# Patient Record
Sex: FEMALE | Race: BLACK OR AFRICAN AMERICAN | Employment: OTHER | URBAN - METROPOLITAN AREA
[De-identification: names, ages, dates, MRNs, and addresses within clinical notes are randomized per-mention and may not be internally consistent; named-entity substitution may affect disease eponyms.]

---

## 2017-11-14 ENCOUNTER — APPOINTMENT (OUTPATIENT)
Dept: ULTRASOUND IMAGING | Age: 72
End: 2017-11-14
Attending: PHYSICIAN ASSISTANT
Payer: MEDICARE

## 2017-11-14 ENCOUNTER — HOSPITAL ENCOUNTER (EMERGENCY)
Age: 72
Discharge: HOME | End: 2017-11-14
Attending: EMERGENCY MEDICINE
Payer: MEDICARE

## 2017-11-14 VITALS
SYSTOLIC BLOOD PRESSURE: 130 MMHG | RESPIRATION RATE: 12 BRPM | BODY MASS INDEX: 45.07 KG/M2 | HEIGHT: 66 IN | WEIGHT: 280.43 LBS | TEMPERATURE: 97.9 F | DIASTOLIC BLOOD PRESSURE: 77 MMHG | HEART RATE: 72 BPM | OXYGEN SATURATION: 99 %

## 2017-11-14 DIAGNOSIS — M79.662 PAIN OF LEFT CALF: Primary | ICD-10-CM

## 2017-11-14 PROCEDURE — 99283 EMERGENCY DEPT VISIT LOW MDM: CPT

## 2017-11-14 PROCEDURE — 93971 EXTREMITY STUDY: CPT

## 2017-11-14 PROCEDURE — 99282 EMERGENCY DEPT VISIT SF MDM: CPT

## 2017-11-14 RX ORDER — LISINOPRIL 20 MG/1
20 TABLET ORAL DAILY
COMMUNITY

## 2017-11-14 RX ORDER — HYDROCHLOROTHIAZIDE 12.5 MG/1
12.5 TABLET ORAL DAILY
COMMUNITY

## 2017-11-14 RX ORDER — MELATONIN
1000 DAILY
COMMUNITY

## 2017-11-14 RX ORDER — LANOLIN ALCOHOL/MO/W.PET/CERES
CREAM (GRAM) TOPICAL
COMMUNITY

## 2017-11-14 RX ORDER — TRAMADOL HYDROCHLORIDE 50 MG/1
50 TABLET ORAL
Qty: 20 TAB | Refills: 0 | Status: SHIPPED | OUTPATIENT
Start: 2017-11-14

## 2017-11-14 NOTE — ED PROVIDER NOTES
[facility].  EMERGENCY DEPARTMENT HISTORY AND PHYSICAL EXAM         Date of Service: 11/14/2017   Patient Name: Eduardo Bello   YOB: 1945  Medical Record Number: [id number]    History of Presenting Illness     Chief Complaint   Patient presents with    Knee Pain     Onset of pain to left knee last night radiating down front of calf. Patient denies injury. History Provided By:  patient    Additional History:   Eduardo Bello is a 74 y.o. female with no significant PMHx who presents ambulatory to the ED with cc of constant, moderate left calf pain and tightness since last night. The pain is exacerbated by walking. Pt states she has not been on an recent long trips, and she is not on any hormone replacement, nor any recent surgeries. She denies any history of similar symptoms, and recent injury, or any headache, dizziness, light headedness, shortness of breath, chest pain, abdominal pain, nausea, vomiting, diarrhea, dysuria. Social Hx: - Tobacco, - EtOH, - Illicit Drugs    There are no other complaints, changes or physical findings at this time. Primary Care Provider: Arron Ray NP     Past History     Past Medical History:   History reviewed. No pertinent past medical history. Past Surgical History:   History reviewed. No pertinent surgical history. Family History:   Family History   Problem Relation Age of Onset    Breast Cancer Sister      under 48        Social History:   Social History   Substance Use Topics    Smoking status: Never Smoker    Smokeless tobacco: Never Used    Alcohol use None        Allergies:   No Known Allergies     Review of Systems   Review of Systems   Constitutional: Negative. HENT: Negative. Eyes: Negative. Respiratory: Negative. Negative for shortness of breath. Cardiovascular: Negative. Negative for chest pain. Gastrointestinal: Negative. Negative for abdominal pain, diarrhea, nausea and vomiting. Genitourinary: Negative. Musculoskeletal: Positive for myalgias (left calf pain and tightness). Skin: Negative. Neurological: Negative for dizziness, light-headedness and headaches. All other systems reviewed and are negative. Physical Exam  Physical Exam   Constitutional: She is oriented to person, place, and time. She appears well-developed and well-nourished. No distress. Pleasant, overweight 73 yo -American female   HENT:   Head: Normocephalic and atraumatic. Eyes: Conjunctivae are normal. Right eye exhibits no discharge. Left eye exhibits no discharge. Neck: Normal range of motion. Neck supple. Cardiovascular: Normal rate, regular rhythm, normal heart sounds and intact distal pulses. No murmur heard. Pulmonary/Chest: Effort normal and breath sounds normal. No respiratory distress. She has no wheezes. Musculoskeletal:   L LEG: No swelling, ecchymosis or deformity. Very slight tenderness to palpation of the calf. Otherwise NT to palpation of the left lower extremity. Distal NV intact. Cap refill < 2 sec. Ambulatory with slight limp. Neurological: She is alert and oriented to person, place, and time. Skin: Skin is warm and dry. She is not diaphoretic. Psychiatric: She has a normal mood and affect. Her behavior is normal.   Nursing note and vitals reviewed. Medical Decision Making   I am the first provider for this patient. I reviewed the vital signs, available nursing notes, past medical history, past surgical history, family history and social history. Old Medical Records: Old medical records. Provider Notes:   DDx: DVT, sprain, strain     ED Course:  9:39 AM   Initial assessment performed. The patients presenting problems have been discussed, and they are in agreement with the care plan formulated and outlined with them. I have encouraged them to ask questions as they arise throughout their visit.     Diagnostic Study Results   Radiologic Studies -  The following have been ordered and reviewed:  Study Result      INDICATION:  Left leg swelling and pain, DVT suspected     COMPARISON: 2/11/2012     FINDINGS: Duplex Doppler sonography of the left lower extremity was performed  from the groin to the calf. The left common femoral, femoral and popliteal veins  are compressible with normal color-flow and wave forms and response to  physiologic maneuvers including Valsalva and augmentation.     IMPRESSION  IMPRESSION:  No deep venous thrombosis identified.            Vital Signs-Reviewed the patient's vital signs. Patient Vitals for the past 12 hrs:   Temp Pulse Resp BP SpO2   11/14/17 1027 97.9 °F (36.6 °C) 72 12 130/77 99 %       Diagnosis:  Clinical Impression:   1. Pain of left calf         Plan:  1:   Follow-up Information     Follow up With Details Comments Katarina Jacobs, NP In 1 week As needed [address] [address]  [phone]            2:   Discharge Medication List as of 11/14/2017 12:10 PM      START taking these medications    Details   traMADol (ULTRAM) 50 mg tablet Take 1 Tab by mouth every six (6) hours as needed for Pain. Max Daily Amount: 200 mg., Print, Disp-20 Tab, R-0         CONTINUE these medications which have NOT CHANGED    Details   lisinopril (PRINIVIL, ZESTRIL) 20 mg tablet Take 20 mg by mouth daily. , Historical Med      ferrous sulfate (IRON) 325 mg (65 mg iron) tablet Take  by mouth Daily (before breakfast). , Historical Med      hydroCHLOROthiazide (HYDRODIURIL) 12.5 mg tablet Take 12.5 mg by mouth daily. , Historical Med      cholecalciferol (VITAMIN D3) 1,000 unit tablet Take 1,000 Units by mouth daily. , Historical Med      amLODIPine (NORVASC) 10 mg tablet Take 1 Tab by mouth daily. , Print, Disp-30 Tab, R-0           Return to ED if worse. Disposition:    DISCHARGE NOTE  12:25 PM  The patient has been re-evaluated and is ready for discharge. Reviewed available results with patient.  Counseled pt on diagnosis and care plan. Pt has expressed understanding, and all questions have been answered. Pt agrees with plan and agrees to follow up as recommended, or return to the ED if their symptoms worsen. Discharge instructions have been provided and explained to the pt, along with reasons to return to the ED. Attestations: This note is prepared by Reese Milian, acting as Scribe for [facility]: The scribe's documentation has been prepared under my direction and personally reviewed by me in its entirety. I confirm that the note above accurately reflects all work, treatment, procedures, and medical decision making performed by me.

## 2017-11-14 NOTE — ED NOTES
Patient discharged by KAI Guevara. Patient provided with discharge instructions Rx and instructions on follow up care. Patient out of ED ambulatory accompanied by family.

## 2017-11-14 NOTE — ED NOTES
Pt state she fell 2 weeks ago and woke up this morning with pain in her left knee/lower leg. Pt states she \" can't walk on it is too painful. \" pt took her morning medications for HTN but has no other hx. Denies recent injury too limb. Positive pedal pulse felt.

## 2017-11-14 NOTE — DISCHARGE INSTRUCTIONS
Leg Pain: Care Instructions  Your Care Instructions  Many things can cause leg pain. Too much exercise or overuse can cause a muscle cramp (or charley horse). You can get leg cramps from not eating a balanced diet that has enough potassium, calcium, and other minerals. If you do not drink enough fluids or are taking certain medicines, you may develop leg cramps. Other causes of leg pain include injuries, blood flow problems, nerve damage, and twisted and enlarged veins (varicose veins). You can usually ease pain with self-care. Your doctor may recommend that you rest your leg and keep it elevated. Follow-up care is a key part of your treatment and safety. Be sure to make and go to all appointments, and call your doctor if you are having problems. It's also a good idea to know your test results and keep a list of the medicines you take. How can you care for yourself at home? · Take pain medicines exactly as directed. ¨ If the doctor gave you a prescription medicine for pain, take it as prescribed. ¨ If you are not taking a prescription pain medicine, ask your doctor if you can take an over-the-counter medicine. · Take any other medicines exactly as prescribed. Call your doctor if you think you are having a problem with your medicine. · Rest your leg while you have pain, and avoid standing for long periods of time. · Prop up your leg at or above the level of your heart when possible. · Make sure you are eating a balanced diet that is rich in calcium, potassium, and magnesium, especially if you are pregnant. · If directed by your doctor, put ice or a cold pack on the area for 10 to 20 minutes at a time. Put a thin cloth between the ice and your skin. · Your leg may be in a splint, a brace, or an elastic bandage, and you may have crutches to help you walk. Follow your doctor's directions about how long to wear supports and how to use the crutches. When should you call for help?   Call 911 anytime you think you may need emergency care. For example, call if:  ? · You have sudden chest pain and shortness of breath, or you cough up blood. ? · Your leg is cool or pale or changes color. ?Call your doctor now or seek immediate medical care if:  ? · You have increasing or severe pain. ? · Your leg suddenly feels weak and you cannot move it. ? · You have signs of a blood clot, such as:  ¨ Pain in your calf, back of the knee, thigh, or groin. ¨ Redness and swelling in your leg or groin. ? · You have signs of infection, such as:  ¨ Increased pain, swelling, warmth, or redness. ¨ Red streaks leading from the sore area. ¨ Pus draining from a place on your leg. ¨ A fever. ? · You cannot bear weight on your leg. ? Watch closely for changes in your health, and be sure to contact your doctor if:  ? · You do not get better as expected. Where can you learn more? Go to http://obdulia-johnathan.info/. Enter V552 in the search box to learn more about \"Leg Pain: Care Instructions. \"  Current as of: March 20, 2017  Content Version: 11.4  © 0148-6425 [employer]. Care instructions adapted under license by [employer] (which disclaims liability or warranty for this information). If you have questions about a medical condition or this instruction, always ask your healthcare professional. [place] [address] any warranty or liability for your use of this information.

## 2019-03-04 ENCOUNTER — HOSPITAL ENCOUNTER (OUTPATIENT)
Dept: INFUSION THERAPY | Age: 74
End: 2019-03-04

## 2019-03-15 ENCOUNTER — HOSPITAL ENCOUNTER (OUTPATIENT)
Dept: INFUSION THERAPY | Age: 74
Discharge: HOME | End: 2019-03-15
Payer: MEDICARE

## 2019-03-15 VITALS
RESPIRATION RATE: 18 BRPM | DIASTOLIC BLOOD PRESSURE: 90 MMHG | HEART RATE: 66 BPM | TEMPERATURE: 99 F | SYSTOLIC BLOOD PRESSURE: 148 MMHG | OXYGEN SATURATION: 97 %

## 2019-03-15 LAB
ANION GAP BLD CALC-SCNC: 16 MMOL/L (ref 10–20)
BUN BLD-MCNC: 26 MG/DL (ref 9–20)
CA-I BLD-MCNC: 1.24 MMOL/L (ref 1.12–1.32)
CHLORIDE BLD-SCNC: 103 MMOL/L (ref 98–107)
CO2 BLD-SCNC: 26 MMOL/L (ref 21–32)
CREAT BLD-MCNC: 1.3 MG/DL (ref 0.6–1.3)
GLUCOSE BLD-MCNC: 98 MG/DL (ref 65–100)
HCT VFR BLD CALC: 33 % (ref 35–47)
MAGNESIUM SERPL-MCNC: 2.2 MG/DL (ref 1.6–2.4)
PHOSPHATE SERPL-MCNC: 3.4 MG/DL (ref 2.6–4.7)
POTASSIUM BLD-SCNC: 4.5 MMOL/L (ref 3.5–5.1)
SERVICE CMNT-IMP: ABNORMAL
SODIUM BLD-SCNC: 140 MMOL/L (ref 136–145)

## 2019-03-15 PROCEDURE — 84100 ASSAY OF PHOSPHORUS: CPT

## 2019-03-15 PROCEDURE — 36415 COLL VENOUS BLD VENIPUNCTURE: CPT

## 2019-03-15 PROCEDURE — 96372 THER/PROPH/DIAG INJ SC/IM: CPT

## 2019-03-15 PROCEDURE — 80047 BASIC METABLC PNL IONIZED CA: CPT

## 2019-03-15 PROCEDURE — 74011250636 HC RX REV CODE- 250/636

## 2019-03-15 PROCEDURE — 83735 ASSAY OF MAGNESIUM: CPT

## 2019-03-15 RX ADMIN — DENOSUMAB 60 MG: 60 INJECTION SUBCUTANEOUS at 14:09

## 2019-03-15 NOTE — PROGRESS NOTES
[facility] Progress Note    7161  Pt admit to [facility] for first prolia ambulatory in stable condition. Assessment completed. No acute concerns voiced. Visit Vitals  /90 (BP 1 Location: Left arm, BP Patient Position: Sitting)   Pulse 66   Temp 99 °F (37.2 °C)   Resp 18   SpO2 97%   Breastfeeding? No     Recent Results (from the past 12 hour(s))   POC CHEM8    Collection Time: 03/15/19  1:55 PM   Result Value Ref Range    Calcium, ionized (POC) 1.24 1.12 - 1.32 mmol/L    Sodium (POC) 140 136 - 145 mmol/L    Potassium (POC) 4.5 3.5 - 5.1 mmol/L    Chloride (POC) 103 98 - 107 mmol/L    CO2 (POC) 26 21 - 32 mmol/L    Anion gap (POC) 16 10 - 20 mmol/L    Glucose (POC) 98 65 - 100 mg/dL    BUN (POC) 26 (H) 9 - 20 mg/dL    Creatinine (POC) 1.3 0.6 - 1.3 mg/dL    GFRAA, POC 48 (L) >60 ml/min/1.73m2    GFRNA, POC 40 (L) >60 ml/min/1.73m2    Hematocrit (POC) 33 (L) 35.0 - 47.0 %    Comment Notified RN or MD immediately by        Labs within treatment parameters. Pt denies any recent dental work. Pt instructed to drink plenty of fluids, continue vitamin D supplementation, and avoid invasive dental procedures. Pt verbalized understanding. Medications:  Prolia SQ    1415  Pt tolerated treatment well. D/c home ambulatory in no distress. Pt aware of next appointment scheduled for 9/20/19 at 1400.

## 2019-03-15 NOTE — PROGRESS NOTES
Problem: Patient Education:  Go to Education Activity  Goal: Patient/Family Education  Outcome: Progressing Towards Goal  Prolia patient medication provided for patient.

## 2019-09-20 ENCOUNTER — HOSPITAL ENCOUNTER (OUTPATIENT)
Dept: INFUSION THERAPY | Age: 74
Discharge: HOME | End: 2019-09-20

## 2019-10-07 ENCOUNTER — HOSPITAL ENCOUNTER (OUTPATIENT)
Dept: INFUSION THERAPY | Age: 74
Discharge: HOME | End: 2019-10-07
Payer: MEDICARE

## 2019-10-08 ENCOUNTER — HOSPITAL ENCOUNTER (OUTPATIENT)
Dept: INFUSION THERAPY | Age: 74
Discharge: HOME | End: 2019-10-08
Payer: MEDICARE

## 2019-10-08 VITALS
DIASTOLIC BLOOD PRESSURE: 77 MMHG | HEIGHT: 65 IN | HEART RATE: 57 BPM | SYSTOLIC BLOOD PRESSURE: 131 MMHG | OXYGEN SATURATION: 98 % | WEIGHT: 271.9 LBS | TEMPERATURE: 98.3 F | BODY MASS INDEX: 45.3 KG/M2 | RESPIRATION RATE: 16 BRPM

## 2019-10-08 LAB
ANION GAP BLD CALC-SCNC: 13 MMOL/L (ref 10–20)
BUN BLD-MCNC: 27 MG/DL (ref 9–20)
CA-I BLD-MCNC: 1.28 MMOL/L (ref 1.12–1.32)
CHLORIDE BLD-SCNC: 105 MMOL/L (ref 98–107)
CO2 BLD-SCNC: 29 MMOL/L (ref 21–32)
CREAT BLD-MCNC: 1.3 MG/DL (ref 0.6–1.3)
GLUCOSE BLD-MCNC: 98 MG/DL (ref 65–100)
HCT VFR BLD CALC: 36 % (ref 35–47)
MAGNESIUM SERPL-MCNC: 1.9 MG/DL (ref 1.6–2.4)
PHOSPHATE SERPL-MCNC: 3.2 MG/DL (ref 2.6–4.7)
POTASSIUM BLD-SCNC: 4.5 MMOL/L (ref 3.5–5.1)
SERVICE CMNT-IMP: ABNORMAL
SODIUM BLD-SCNC: 141 MMOL/L (ref 136–145)

## 2019-10-08 PROCEDURE — 80047 BASIC METABLC PNL IONIZED CA: CPT

## 2019-10-08 PROCEDURE — 96372 THER/PROPH/DIAG INJ SC/IM: CPT

## 2019-10-08 PROCEDURE — 83735 ASSAY OF MAGNESIUM: CPT

## 2019-10-08 PROCEDURE — 84100 ASSAY OF PHOSPHORUS: CPT

## 2019-10-08 PROCEDURE — 36415 COLL VENOUS BLD VENIPUNCTURE: CPT

## 2019-10-08 PROCEDURE — 74011250636 HC RX REV CODE- 250/636: Performed by: NURSE PRACTITIONER

## 2019-10-08 RX ADMIN — DENOSUMAB 60 MG: 60 INJECTION SUBCUTANEOUS at 15:07

## 2019-10-08 NOTE — PROGRESS NOTES
[address] [facility] Progress Note    1450  Pt arrived ambulatory and in no distress for Prolia    Assessment unremarkable, no new concerns voiced. Labs obtained per order and sent for processing. Patient Vitals for the past 12 hrs:   Temp Pulse Resp BP SpO2   10/08/19 1450 98.3 °F (36.8 °C) (!) 57 16 131/77 98 %      Recent Results (from the past 12 hour(s))   POC CHEM8    Collection Time: 10/08/19  3:00 PM   Result Value Ref Range    Calcium, ionized (POC) 1.28 1.12 - 1.32 mmol/L    Sodium (POC) 141 136 - 145 mmol/L    Potassium (POC) 4.5 3.5 - 5.1 mmol/L    Chloride (POC) 105 98 - 107 mmol/L    CO2 (POC) 29 21 - 32 mmol/L    Anion gap (POC) 13 10 - 20 mmol/L    Glucose (POC) 98 65 - 100 mg/dL    BUN (POC) 27 (H) 9 - 20 mg/dL    Creatinine (POC) 1.3 0.6 - 1.3 mg/dL    GFRAA, POC 48 (L) >60 ml/min/1.73m2    GFRNA, POC 40 (L) >60 ml/min/1.73m2    Hematocrit (POC) 36 35.0 - 47.0 %    Comment Notified RN or MD immediately by         The following medications administered:  Medications Administered     denosumab (PROLIA) injection 60 mg     Admin Date  10/08/2019 Action  Given Dose  60 mg Route  SubCUTAneous Administered By  Edgar Watson RN              Pt tolerated treatment well. No s/s of adverse reaction noted. Pt provided with education on possible side effects of medication along with discharge instructions. Pt verbalized understanding. 1510  Pt discharged ambulatory in no acute distress.  Next appointment: 4/9/20 at 1:30 PM.

## 2020-04-09 ENCOUNTER — HOSPITAL ENCOUNTER (OUTPATIENT)
Dept: INFUSION THERAPY | Age: 75
End: 2020-04-09

## 2020-10-20 ENCOUNTER — TRANSCRIBE ORDER (OUTPATIENT)
Dept: SCHEDULING | Age: 75
End: 2020-10-20

## 2020-10-20 DIAGNOSIS — Z12.31 VISIT FOR SCREENING MAMMOGRAM: Primary | ICD-10-CM

## 2020-10-20 DIAGNOSIS — Z78.0 ASYMPTOMATIC MENOPAUSAL STATE: ICD-10-CM

## 2020-12-09 ENCOUNTER — HOSPITAL ENCOUNTER (OUTPATIENT)
Dept: BONE DENSITY | Age: 75
Discharge: HOME | End: 2020-12-09
Attending: NURSE PRACTITIONER
Payer: MEDICARE

## 2020-12-09 ENCOUNTER — HOSPITAL ENCOUNTER (OUTPATIENT)
Dept: MAMMOGRAPHY | Age: 75
Discharge: HOME | End: 2020-12-09
Attending: NURSE PRACTITIONER
Payer: MEDICARE

## 2020-12-09 DIAGNOSIS — Z78.0 ASYMPTOMATIC MENOPAUSAL STATE: ICD-10-CM

## 2020-12-09 DIAGNOSIS — Z12.31 VISIT FOR SCREENING MAMMOGRAM: ICD-10-CM

## 2020-12-09 PROCEDURE — 77080 DXA BONE DENSITY AXIAL: CPT

## 2020-12-09 PROCEDURE — 77067 SCR MAMMO BI INCL CAD: CPT

## 2021-12-07 ENCOUNTER — TRANSCRIBE ORDER (OUTPATIENT)
Dept: SCHEDULING | Age: 76
End: 2021-12-07

## 2021-12-07 DIAGNOSIS — Z12.31 SCREENING MAMMOGRAM FOR HIGH-RISK PATIENT: Primary | ICD-10-CM

## 2023-05-11 RX ORDER — FERROUS SULFATE 325(65) MG
TABLET ORAL
COMMUNITY

## 2023-05-11 RX ORDER — AMLODIPINE BESYLATE 10 MG/1
TABLET ORAL DAILY
COMMUNITY
Start: 2014-03-09

## 2023-05-11 RX ORDER — TRAMADOL HYDROCHLORIDE 50 MG/1
TABLET ORAL EVERY 6 HOURS PRN
COMMUNITY
Start: 2017-11-14

## 2023-05-11 RX ORDER — LISINOPRIL 20 MG/1
20 TABLET ORAL DAILY
COMMUNITY

## 2023-05-11 RX ORDER — HYDROCHLOROTHIAZIDE 12.5 MG/1
12.5 TABLET ORAL DAILY
COMMUNITY

## 2023-08-30 ENCOUNTER — TRANSCRIBE ORDERS (OUTPATIENT)
Facility: HOSPITAL | Age: 78
End: 2023-08-30

## 2023-08-30 DIAGNOSIS — M81.0 OSTEOPOROSIS, UNSPECIFIED OSTEOPOROSIS TYPE, UNSPECIFIED PATHOLOGICAL FRACTURE PRESENCE: Primary | ICD-10-CM

## 2023-08-30 DIAGNOSIS — Z78.0 ASYMPTOMATIC MENOPAUSAL STATE: ICD-10-CM

## 2023-10-23 ENCOUNTER — HOSPITAL ENCOUNTER (EMERGENCY)
Facility: HOSPITAL | Age: 78
Discharge: HOME | End: 2023-10-23
Attending: EMERGENCY MEDICINE
Payer: MEDICARE

## 2023-10-23 VITALS
TEMPERATURE: 97.9 F | SYSTOLIC BLOOD PRESSURE: 185 MMHG | WEIGHT: 268.96 LBS | RESPIRATION RATE: 18 BRPM | OXYGEN SATURATION: 99 % | BODY MASS INDEX: 45.92 KG/M2 | DIASTOLIC BLOOD PRESSURE: 72 MMHG | HEIGHT: 64 IN | HEART RATE: 58 BPM

## 2023-10-23 DIAGNOSIS — M51.36 DEGENERATIVE DISC DISEASE, LUMBAR: ICD-10-CM

## 2023-10-23 DIAGNOSIS — M54.16 LUMBAR RADICULOPATHY: ICD-10-CM

## 2023-10-23 DIAGNOSIS — M79.605 LEFT LEG PAIN: Primary | ICD-10-CM

## 2023-10-23 PROCEDURE — 99283 EMERGENCY DEPT VISIT LOW MDM: CPT

## 2023-10-23 RX ORDER — PREDNISONE 20 MG/1
40 TABLET ORAL DAILY
Qty: 14 TABLET | Refills: 0 | Status: SHIPPED | OUTPATIENT
Start: 2023-10-23 | End: 2023-10-30

## 2023-10-23 RX ORDER — IBUPROFEN 600 MG/1
600 TABLET ORAL EVERY 6 HOURS PRN
Qty: 20 TABLET | Refills: 1 | Status: SHIPPED | OUTPATIENT
Start: 2023-10-23 | End: 2023-11-02

## 2023-10-23 ASSESSMENT — PAIN - FUNCTIONAL ASSESSMENT: PAIN_FUNCTIONAL_ASSESSMENT: 0-10

## 2023-10-23 ASSESSMENT — PAIN DESCRIPTION - LOCATION: LOCATION: LEG

## 2023-10-23 ASSESSMENT — PAIN SCALES - GENERAL: PAINLEVEL_OUTOF10: 5

## 2023-10-23 NOTE — ED PROVIDER NOTES
or so.  No injuries. She noticed a minor bruise on the lateral aspect of the lower leg, wondered if she might have a blood clot. No no history of swelling in the leg. No pain in the thigh. Any thromboembolism. She also complains of chronic numbness and tingling sensations in the right leg. Her symptoms are mild. Reassuring vital signs, hypertensive. No apparent distress or discomfort. No dyspnea. Completely normal examination of the left leg. Warm and well-perfused. No apparent edema. No apparent bruising or skin changes. No tenderness whatsoever even with deep palpation. Normal range of motion of all joints including the knee and ankle. No significant clinical concern for DVT, also no concern for Baker's cyst, cellulitis, etc.    Symptoms are mild and examination is completely normal.  At this time I have advised the patient that we will defer duplex ultrasound as my clinical concern for DVT is very low. Suspect that her symptoms are arising from her lumbar spine, causing pain in her leg as well as numbness in the contralateral leg. Advise a course of prednisone and ibuprofen and follow-up primary care for now. Final Diagnosis:   1. Left leg pain    2. Degenerative disc disease, lumbar    3. Lumbar radiculopathy        Additional documentation if relevant for this encounter       Shared decision making: Patient is comfortable deferring ultrasound for now      Diagnosis and Disposition     Disposition: Decision To Discharge 10/23/2023 12:19:34 PM     Final Diagnosis:   1. Left leg pain    2. Degenerative disc disease, lumbar    3.  Lumbar radiculopathy           Medication List        START taking these medications      ibuprofen 600 MG tablet  Commonly known as: ADVIL;MOTRIN  Take 1 tablet by mouth every 6 hours as needed for Pain     predniSONE 20 MG tablet  Commonly known as: DELTASONE  Take 2 tablets by mouth daily for 7 days            ASK your doctor about these medications

## 2023-10-23 NOTE — ED NOTES
DC info reviewed with patient, all questions answered. Patient well-appearing at time of discharge and vital signs stable. Ambulatory out of ED at this time.        Ofe Landis RN  10/23/23 6769

## 2023-10-23 NOTE — DISCHARGE INSTRUCTIONS
Your examination today is very reassuring and is not consistent with a blood clot in your leg, or any vascular (blood flow) problems. I would simply recommend you try prednisone 40 mg once a day for a week as well as ibuprofen 600 mg about twice a day. I suspect that your symptoms are actually arising in your lower back (lumbar spine) and are traveling down the nerves. This is sometimes called sciatica. The prednisone and ibuprofen may calm down the sciatica symptoms. You can follow-up with your primary care physician. It was a pleasure taking care of you at [facility] Emergency Department today. We know that when you come to [facility], you are entrusting us with your health, comfort, and safety. Our physicians and nurses honor that trust, and we truly appreciate the opportunity to care for you and your loved ones. We also value your feedback. If you receive a survey about your Emergency Department experience today, please fill it out. We care about our patients' feedback, and we listen to what you have to say. Thank you!

## 2023-12-07 ENCOUNTER — APPOINTMENT (OUTPATIENT)
Facility: HOSPITAL | Age: 78
End: 2023-12-07
Attending: INTERNAL MEDICINE
Payer: MEDICARE

## 2023-12-07 ENCOUNTER — APPOINTMENT (OUTPATIENT)
Facility: HOSPITAL | Age: 78
End: 2023-12-07
Payer: MEDICARE

## 2023-12-07 ENCOUNTER — HOSPITAL ENCOUNTER (INPATIENT)
Facility: HOSPITAL | Age: 78
LOS: 2 days | Discharge: HOME | End: 2023-12-09
Attending: EMERGENCY MEDICINE | Admitting: STUDENT IN AN ORGANIZED HEALTH CARE EDUCATION/TRAINING PROGRAM
Payer: MEDICARE

## 2023-12-07 DIAGNOSIS — R10.9 ACUTE RIGHT FLANK PAIN: ICD-10-CM

## 2023-12-07 DIAGNOSIS — R09.02 HYPOXIA: ICD-10-CM

## 2023-12-07 DIAGNOSIS — R06.09 DOE (DYSPNEA ON EXERTION): ICD-10-CM

## 2023-12-07 DIAGNOSIS — I26.99 ACUTE PULMONARY EMBOLISM WITHOUT ACUTE COR PULMONALE, UNSPECIFIED PULMONARY EMBOLISM TYPE (HCC): Primary | ICD-10-CM

## 2023-12-07 DIAGNOSIS — I26.99 PULMONARY EMBOLISM ON RIGHT (HCC): ICD-10-CM

## 2023-12-07 LAB
ALBUMIN SERPL-MCNC: 3.5 G/DL (ref 3.5–5)
ALBUMIN/GLOB SERPL: 0.7 (ref 1.1–2.2)
ALP SERPL-CCNC: 78 U/L (ref 45–117)
ALT SERPL-CCNC: 14 U/L (ref 12–78)
ANION GAP BLD CALC-SCNC: 7 (ref 10–20)
ANION GAP SERPL CALC-SCNC: 6 MMOL/L (ref 5–15)
APPEARANCE UR: CLEAR
AST SERPL-CCNC: 12 U/L (ref 15–37)
BACTERIA URNS QL MICRO: NEGATIVE /HPF
BASE EXCESS BLD CALC-SCNC: 4.3 MMOL/L
BASOPHILS # BLD: 0.1 K/UL (ref 0–0.1)
BASOPHILS NFR BLD: 1 % (ref 0–1)
BILIRUB SERPL-MCNC: 0.8 MG/DL (ref 0.2–1)
BILIRUB UR QL: NEGATIVE
BUN SERPL-MCNC: 19 MG/DL (ref 6–20)
BUN/CREAT SERPL: 17 (ref 12–20)
CA-I BLD-MCNC: 1.21 MMOL/L (ref 1.12–1.32)
CALCIUM SERPL-MCNC: 9.7 MG/DL (ref 8.5–10.1)
CHLORIDE BLD-SCNC: 106 MMOL/L (ref 100–108)
CHLORIDE SERPL-SCNC: 107 MMOL/L (ref 97–108)
CO2 BLD-SCNC: 29 MMOL/L (ref 19–24)
CO2 SERPL-SCNC: 28 MMOL/L (ref 21–32)
COLOR UR: ABNORMAL
CREAT SERPL-MCNC: 1.12 MG/DL (ref 0.55–1.02)
CREAT UR-MCNC: 1.3 MG/DL (ref 0.6–1.3)
DIFFERENTIAL METHOD BLD: NORMAL
ECHO AO ASC DIAM: 3.4 CM
ECHO AO ASCENDING AORTA INDEX: 1.51 CM/M2
ECHO AO ROOT DIAM: 3.6 CM
ECHO AO ROOT INDEX: 1.6 CM/M2
ECHO AV AREA PEAK VELOCITY: 2.4 CM2
ECHO AV AREA/BSA PEAK VELOCITY: 1.1 CM2/M2
ECHO AV PEAK GRADIENT: 11 MMHG
ECHO AV PEAK VELOCITY: 1.7 M/S
ECHO AV VELOCITY RATIO: 0.71
ECHO BSA: 2.39 M2
ECHO BSA: 2.39 M2
ECHO LA DIAMETER INDEX: 1.73 CM/M2
ECHO LA DIAMETER: 3.9 CM
ECHO LA TO AORTIC ROOT RATIO: 1.08
ECHO LV E' LATERAL VELOCITY: 9 CM/S
ECHO LV E' SEPTAL VELOCITY: 6 CM/S
ECHO LV FRACTIONAL SHORTENING: 39 % (ref 28–44)
ECHO LV INTERNAL DIMENSION DIASTOLE INDEX: 2.04 CM/M2
ECHO LV INTERNAL DIMENSION DIASTOLIC: 4.6 CM (ref 3.9–5.3)
ECHO LV INTERNAL DIMENSION SYSTOLIC INDEX: 1.24 CM/M2
ECHO LV INTERNAL DIMENSION SYSTOLIC: 2.8 CM
ECHO LV IVSD: 1.4 CM (ref 0.6–0.9)
ECHO LV MASS 2D: 243.3 G (ref 67–162)
ECHO LV MASS INDEX 2D: 108.1 G/M2 (ref 43–95)
ECHO LV POSTERIOR WALL DIASTOLIC: 1.3 CM (ref 0.6–0.9)
ECHO LV RELATIVE WALL THICKNESS RATIO: 0.57
ECHO LVOT AREA: 3.5 CM2
ECHO LVOT DIAM: 2.1 CM
ECHO LVOT PEAK GRADIENT: 6 MMHG
ECHO LVOT PEAK VELOCITY: 1.2 M/S
ECHO MV A VELOCITY: 0.85 M/S
ECHO MV E DECELERATION TIME (DT): 266 MS
ECHO MV E VELOCITY: 0.52 M/S
ECHO MV E/A RATIO: 0.61
ECHO MV E/E' LATERAL: 5.78
ECHO MV E/E' RATIO (AVERAGED): 7.22
ECHO PV MAX VELOCITY: 1.2 M/S
ECHO PV PEAK GRADIENT: 6 MMHG
ECHO RV FREE WALL PEAK S': 14 CM/S
ECHO RV TAPSE: 1.2 CM (ref 1.7–?)
ECHO TV REGURGITANT MAX VELOCITY: 3.85 M/S
ECHO TV REGURGITANT PEAK GRADIENT: 59 MMHG
EKG ATRIAL RATE: 79 BPM
EKG DIAGNOSIS: NORMAL
EKG P AXIS: 72 DEGREES
EKG P-R INTERVAL: 142 MS
EKG Q-T INTERVAL: 390 MS
EKG QRS DURATION: 80 MS
EKG QTC CALCULATION (BAZETT): 447 MS
EKG R AXIS: 29 DEGREES
EKG T AXIS: 53 DEGREES
EKG VENTRICULAR RATE: 79 BPM
EOSINOPHIL # BLD: 0.1 K/UL (ref 0–0.4)
EOSINOPHIL NFR BLD: 1 % (ref 0–7)
EPITH CASTS URNS QL MICRO: ABNORMAL /LPF
ERYTHROCYTE [DISTWIDTH] IN BLOOD BY AUTOMATED COUNT: 14.5 % (ref 11.5–14.5)
GLOBULIN SER CALC-MCNC: 5.1 G/DL (ref 2–4)
GLUCOSE BLD STRIP.AUTO-MCNC: 121 MG/DL (ref 74–106)
GLUCOSE SERPL-MCNC: 133 MG/DL (ref 65–100)
GLUCOSE UR STRIP.AUTO-MCNC: NEGATIVE MG/DL
HCO3 BLDA-SCNC: 30 MMOL/L
HCT VFR BLD AUTO: 37 % (ref 35–47)
HGB BLD-MCNC: 11.7 G/DL (ref 11.5–16)
HGB UR QL STRIP: NEGATIVE
HYALINE CASTS URNS QL MICRO: ABNORMAL /LPF (ref 0–2)
IMM GRANULOCYTES # BLD AUTO: 0 K/UL (ref 0–0.04)
IMM GRANULOCYTES NFR BLD AUTO: 0 % (ref 0–0.5)
KETONES UR QL STRIP.AUTO: NEGATIVE MG/DL
LACTATE BLD-SCNC: <0.4 MMOL/L (ref 0.4–2)
LEUKOCYTE ESTERASE UR QL STRIP.AUTO: NEGATIVE
LIPASE SERPL-CCNC: 21 U/L (ref 13–75)
LYMPHOCYTES # BLD: 1.5 K/UL (ref 0.8–3.5)
LYMPHOCYTES NFR BLD: 18 % (ref 12–49)
MCH RBC QN AUTO: 28.1 PG (ref 26–34)
MCHC RBC AUTO-ENTMCNC: 31.6 G/DL (ref 30–36.5)
MCV RBC AUTO: 88.7 FL (ref 80–99)
MONOCYTES # BLD: 0.7 K/UL (ref 0–1)
MONOCYTES NFR BLD: 9 % (ref 5–13)
NEUTS SEG # BLD: 5.6 K/UL (ref 1.8–8)
NEUTS SEG NFR BLD: 71 % (ref 32–75)
NITRITE UR QL STRIP.AUTO: NEGATIVE
NRBC # BLD: 0 K/UL (ref 0–0.01)
NRBC BLD-RTO: 0 PER 100 WBC
PCO2 BLDV: 47.7 MMHG (ref 41–51)
PH BLDV: 7.41 (ref 7.32–7.42)
PH UR STRIP: 6 (ref 5–8)
PLATELET # BLD AUTO: 151 K/UL (ref 150–400)
PMV BLD AUTO: 9.9 FL (ref 8.9–12.9)
PO2 BLDV: <27 MMHG (ref 25–40)
POTASSIUM BLD-SCNC: 3.5 MMOL/L (ref 3.5–5.5)
POTASSIUM SERPL-SCNC: 3.9 MMOL/L (ref 3.5–5.1)
PROT SERPL-MCNC: 8.6 G/DL (ref 6.4–8.2)
PROT UR STRIP-MCNC: NEGATIVE MG/DL
RBC # BLD AUTO: 4.17 M/UL (ref 3.8–5.2)
RBC #/AREA URNS HPF: ABNORMAL /HPF (ref 0–5)
SODIUM BLD-SCNC: 142 MMOL/L (ref 136–145)
SODIUM SERPL-SCNC: 141 MMOL/L (ref 136–145)
SP GR UR REFRACTOMETRY: 1.02
SPECIMEN SITE: ABNORMAL
TROPONIN I SERPL HS-MCNC: 11 NG/L (ref 0–51)
TROPONIN I SERPL HS-MCNC: 12 NG/L (ref 0–51)
URINE CULTURE IF INDICATED: ABNORMAL
UROBILINOGEN UR QL STRIP.AUTO: 1 EU/DL (ref 0.2–1)
WBC # BLD AUTO: 8 K/UL (ref 3.6–11)
WBC URNS QL MICRO: ABNORMAL /HPF (ref 0–4)

## 2023-12-07 PROCEDURE — 81001 URINALYSIS AUTO W/SCOPE: CPT

## 2023-12-07 PROCEDURE — 85025 COMPLETE CBC W/AUTO DIFF WBC: CPT

## 2023-12-07 PROCEDURE — 6370000000 HC RX 637 (ALT 250 FOR IP): Performed by: INTERNAL MEDICINE

## 2023-12-07 PROCEDURE — 93970 EXTREMITY STUDY: CPT

## 2023-12-07 PROCEDURE — 97161 PT EVAL LOW COMPLEX 20 MIN: CPT

## 2023-12-07 PROCEDURE — 97165 OT EVAL LOW COMPLEX 30 MIN: CPT | Performed by: OCCUPATIONAL THERAPIST

## 2023-12-07 PROCEDURE — 74177 CT ABD & PELVIS W/CONTRAST: CPT

## 2023-12-07 PROCEDURE — 97116 GAIT TRAINING THERAPY: CPT

## 2023-12-07 PROCEDURE — 2580000003 HC RX 258: Performed by: INTERNAL MEDICINE

## 2023-12-07 PROCEDURE — 84132 ASSAY OF SERUM POTASSIUM: CPT

## 2023-12-07 PROCEDURE — 82803 BLOOD GASES ANY COMBINATION: CPT

## 2023-12-07 PROCEDURE — 80053 COMPREHEN METABOLIC PANEL: CPT

## 2023-12-07 PROCEDURE — 93005 ELECTROCARDIOGRAM TRACING: CPT | Performed by: EMERGENCY MEDICINE

## 2023-12-07 PROCEDURE — 93306 TTE W/DOPPLER COMPLETE: CPT

## 2023-12-07 PROCEDURE — 2060000000 HC ICU INTERMEDIATE R&B

## 2023-12-07 PROCEDURE — 36415 COLL VENOUS BLD VENIPUNCTURE: CPT

## 2023-12-07 PROCEDURE — 84295 ASSAY OF SERUM SODIUM: CPT

## 2023-12-07 PROCEDURE — 71275 CT ANGIOGRAPHY CHEST: CPT

## 2023-12-07 PROCEDURE — 97535 SELF CARE MNGMENT TRAINING: CPT | Performed by: OCCUPATIONAL THERAPIST

## 2023-12-07 PROCEDURE — 83690 ASSAY OF LIPASE: CPT

## 2023-12-07 PROCEDURE — 82947 ASSAY GLUCOSE BLOOD QUANT: CPT

## 2023-12-07 PROCEDURE — 99285 EMERGENCY DEPT VISIT HI MDM: CPT

## 2023-12-07 PROCEDURE — 6360000004 HC RX CONTRAST MEDICATION: Performed by: EMERGENCY MEDICINE

## 2023-12-07 PROCEDURE — 6360000002 HC RX W HCPCS: Performed by: INTERNAL MEDICINE

## 2023-12-07 PROCEDURE — 82330 ASSAY OF CALCIUM: CPT

## 2023-12-07 PROCEDURE — 6360000002 HC RX W HCPCS: Performed by: STUDENT IN AN ORGANIZED HEALTH CARE EDUCATION/TRAINING PROGRAM

## 2023-12-07 PROCEDURE — 84484 ASSAY OF TROPONIN QUANT: CPT

## 2023-12-07 RX ORDER — SODIUM CHLORIDE 0.9 % (FLUSH) 0.9 %
5-40 SYRINGE (ML) INJECTION EVERY 12 HOURS SCHEDULED
Status: DISCONTINUED | OUTPATIENT
Start: 2023-12-07 | End: 2023-12-09 | Stop reason: HOSPADM

## 2023-12-07 RX ORDER — SODIUM CHLORIDE 9 MG/ML
INJECTION, SOLUTION INTRAVENOUS PRN
Status: DISCONTINUED | OUTPATIENT
Start: 2023-12-07 | End: 2023-12-09 | Stop reason: HOSPADM

## 2023-12-07 RX ORDER — AMLODIPINE BESYLATE 5 MG/1
10 TABLET ORAL DAILY
Status: DISCONTINUED | OUTPATIENT
Start: 2023-12-07 | End: 2023-12-09 | Stop reason: HOSPADM

## 2023-12-07 RX ORDER — TRAMADOL HYDROCHLORIDE 50 MG/1
50 TABLET ORAL EVERY 6 HOURS PRN
Status: DISCONTINUED | OUTPATIENT
Start: 2023-12-07 | End: 2023-12-09 | Stop reason: HOSPADM

## 2023-12-07 RX ORDER — OXYCODONE HYDROCHLORIDE 5 MG/1
2.5 TABLET ORAL EVERY 4 HOURS PRN
Status: DISCONTINUED | OUTPATIENT
Start: 2023-12-07 | End: 2023-12-09 | Stop reason: HOSPADM

## 2023-12-07 RX ORDER — POTASSIUM CHLORIDE 7.45 MG/ML
10 INJECTION INTRAVENOUS PRN
Status: DISCONTINUED | OUTPATIENT
Start: 2023-12-07 | End: 2023-12-09 | Stop reason: HOSPADM

## 2023-12-07 RX ORDER — HYDRALAZINE HYDROCHLORIDE 20 MG/ML
10 INJECTION INTRAMUSCULAR; INTRAVENOUS EVERY 4 HOURS PRN
Status: DISCONTINUED | OUTPATIENT
Start: 2023-12-07 | End: 2023-12-09 | Stop reason: HOSPADM

## 2023-12-07 RX ORDER — POTASSIUM CHLORIDE 20 MEQ/1
40 TABLET, EXTENDED RELEASE ORAL PRN
Status: DISCONTINUED | OUTPATIENT
Start: 2023-12-07 | End: 2023-12-09 | Stop reason: HOSPADM

## 2023-12-07 RX ORDER — MAGNESIUM SULFATE IN WATER 40 MG/ML
2000 INJECTION, SOLUTION INTRAVENOUS PRN
Status: DISCONTINUED | OUTPATIENT
Start: 2023-12-07 | End: 2023-12-09 | Stop reason: HOSPADM

## 2023-12-07 RX ORDER — LISINOPRIL 20 MG/1
20 TABLET ORAL DAILY
Status: DISCONTINUED | OUTPATIENT
Start: 2023-12-07 | End: 2023-12-09 | Stop reason: HOSPADM

## 2023-12-07 RX ORDER — ENOXAPARIN SODIUM 150 MG/ML
1 INJECTION SUBCUTANEOUS 2 TIMES DAILY
Status: DISCONTINUED | OUTPATIENT
Start: 2023-12-07 | End: 2023-12-09 | Stop reason: HOSPADM

## 2023-12-07 RX ORDER — ONDANSETRON 2 MG/ML
4 INJECTION INTRAMUSCULAR; INTRAVENOUS EVERY 6 HOURS PRN
Status: DISCONTINUED | OUTPATIENT
Start: 2023-12-07 | End: 2023-12-09 | Stop reason: HOSPADM

## 2023-12-07 RX ORDER — FERROUS SULFATE 325(65) MG
325 TABLET ORAL
Status: DISCONTINUED | OUTPATIENT
Start: 2023-12-07 | End: 2023-12-09 | Stop reason: HOSPADM

## 2023-12-07 RX ORDER — OXYCODONE HYDROCHLORIDE 5 MG/1
5 TABLET ORAL EVERY 4 HOURS PRN
Status: DISCONTINUED | OUTPATIENT
Start: 2023-12-07 | End: 2023-12-09 | Stop reason: HOSPADM

## 2023-12-07 RX ORDER — ACETAMINOPHEN 650 MG/1
650 SUPPOSITORY RECTAL EVERY 6 HOURS PRN
Status: DISCONTINUED | OUTPATIENT
Start: 2023-12-07 | End: 2023-12-09 | Stop reason: HOSPADM

## 2023-12-07 RX ORDER — ONDANSETRON 2 MG/ML
4 INJECTION INTRAMUSCULAR; INTRAVENOUS
Status: ACTIVE | OUTPATIENT
Start: 2023-12-07 | End: 2023-12-08

## 2023-12-07 RX ORDER — HYDROCHLOROTHIAZIDE 25 MG/1
12.5 TABLET ORAL DAILY
Status: DISCONTINUED | OUTPATIENT
Start: 2023-12-07 | End: 2023-12-09 | Stop reason: HOSPADM

## 2023-12-07 RX ORDER — VITAMIN B COMPLEX
1000 TABLET ORAL DAILY
Status: DISCONTINUED | OUTPATIENT
Start: 2023-12-07 | End: 2023-12-09 | Stop reason: HOSPADM

## 2023-12-07 RX ORDER — ONDANSETRON 4 MG/1
4 TABLET, ORALLY DISINTEGRATING ORAL EVERY 8 HOURS PRN
Status: DISCONTINUED | OUTPATIENT
Start: 2023-12-07 | End: 2023-12-09 | Stop reason: HOSPADM

## 2023-12-07 RX ORDER — ACETAMINOPHEN 325 MG/1
650 TABLET ORAL EVERY 6 HOURS PRN
Status: DISCONTINUED | OUTPATIENT
Start: 2023-12-07 | End: 2023-12-09 | Stop reason: HOSPADM

## 2023-12-07 RX ORDER — SODIUM CHLORIDE 0.9 % (FLUSH) 0.9 %
5-40 SYRINGE (ML) INJECTION PRN
Status: DISCONTINUED | OUTPATIENT
Start: 2023-12-07 | End: 2023-12-09 | Stop reason: HOSPADM

## 2023-12-07 RX ORDER — POLYETHYLENE GLYCOL 3350 17 G/17G
17 POWDER, FOR SOLUTION ORAL DAILY PRN
Status: DISCONTINUED | OUTPATIENT
Start: 2023-12-07 | End: 2023-12-09 | Stop reason: HOSPADM

## 2023-12-07 RX ADMIN — SODIUM CHLORIDE, PRESERVATIVE FREE 10 ML: 5 INJECTION INTRAVENOUS at 20:22

## 2023-12-07 RX ADMIN — IOPAMIDOL 100 ML: 755 INJECTION, SOLUTION INTRAVENOUS at 05:28

## 2023-12-07 RX ADMIN — Medication 1000 UNITS: at 09:01

## 2023-12-07 RX ADMIN — ENOXAPARIN SODIUM 120 MG: 150 INJECTION SUBCUTANEOUS at 20:21

## 2023-12-07 RX ADMIN — FERROUS SULFATE TAB 325 MG (65 MG ELEMENTAL FE) 325 MG: 325 (65 FE) TAB at 09:00

## 2023-12-07 RX ADMIN — AMLODIPINE BESYLATE 10 MG: 5 TABLET ORAL at 09:00

## 2023-12-07 RX ADMIN — SODIUM CHLORIDE, PRESERVATIVE FREE 10 ML: 5 INJECTION INTRAVENOUS at 09:03

## 2023-12-07 RX ADMIN — ENOXAPARIN SODIUM 120 MG: 150 INJECTION SUBCUTANEOUS at 06:27

## 2023-12-07 ASSESSMENT — PAIN - FUNCTIONAL ASSESSMENT: PAIN_FUNCTIONAL_ASSESSMENT: 0-10

## 2023-12-07 ASSESSMENT — PAIN SCALES - GENERAL
PAINLEVEL_OUTOF10: 8
PAINLEVEL_OUTOF10: 6
PAINLEVEL_OUTOF10: 8

## 2023-12-07 ASSESSMENT — PAIN DESCRIPTION - ORIENTATION
ORIENTATION: RIGHT
ORIENTATION: UPPER;MID
ORIENTATION: UPPER;MID

## 2023-12-07 ASSESSMENT — PAIN DESCRIPTION - LOCATION
LOCATION: ABDOMEN
LOCATION: BACK
LOCATION: BACK;ABDOMEN

## 2023-12-07 ASSESSMENT — PAIN DESCRIPTION - DESCRIPTORS
DESCRIPTORS: SHARP;SHOOTING
DESCRIPTORS: SHARP

## 2023-12-07 NOTE — ED NOTES
This RN gave SBAR report to Randa Baca RN at this time. Offered oncoming RN the opportunity to ask any questions and stated pertinent information, additionally tasks that still need to be completed. Pt's bed locked & in lowest position and call light w/in reach. Emergency equipment at bedside.         Cintia Pollard RN  12/07/23 1727

## 2023-12-07 NOTE — ED NOTES
Handoff report given to Olamide Frankel RN by Abdullahi Portillo, Student RN . Report included the following information ED Encounter Summary, ED SBAR, Adult Overview, Intake/Output, MAR, Recent Results, Cardiac Rhythm NSR, Alarm Parameters, and Neuro Assessment. All questions and concerns addressed at this time. Olamide Frankel states she will be down to transport the pt in about 30 minutes.        Paige Jason  12/07/23 2480

## 2023-12-07 NOTE — ED PROVIDER NOTES
EMERGENCY DEPARTMENT HISTORY AND PHYSICAL EXAM     ----------------------------------------------------------------------------  Please note that this dictation was completed with [employer], the [employer] voice recognition software. Quite often unanticipated grammatical, syntax, homophones, and other interpretive errors are inadvertently transcribed by the computer software. Please disregard these errors. Please excuse any errors that have escaped final proofreading  ----------------------------------------------------------------------------      Date: 12/7/2023  Patient Name: Lilibeth Jacobson [address] [place] [address]     Chief Complaint   Patient presents with    Abdominal Pain     Right lower quadrant pain, started around 1600       History obtainted from:  Patient    Other independent source of history: Family    HPI: Emerson Muhammad is a 78 y.o. female, with significant pmhx of hypertension, who presents via private vehicle to the ED with c/o back pain that started behind her right shoulder blade and rotated to her right upper quadrant with massage. Patient denies fever, nausea, vomiting. Denies similar symptoms. No report of dysuria or hematuria, fever, chills. PCP: GARTH Jackman NP    Allergy List:   No Known Allergies      Medications:  No current facility-administered medications for this encounter.      Current Outpatient Medications   Medication Sig Dispense Refill    ibuprofen (ADVIL;MOTRIN) 600 MG tablet Take 1 tablet by mouth every 6 hours as needed for Pain 20 tablet 1    amLODIPine (NORVASC) 10 MG tablet Take by mouth daily      vitamin D (CHOLECALCIFEROL) 25 MCG (1000 UT) TABS tablet Take 1 tablet by mouth daily      ferrous sulfate (IRON 325) 325 (65 Fe) MG tablet Take by mouth every morning (before breakfast)      hydroCHLOROthiazide (HYDRODIURIL) 12.5 MG tablet Take 1 tablet by mouth daily      lisinopril (PRINIVIL;ZESTRIL) 20 MG tablet Take 1 tablet by mouth daily Specialty: John Fletcher due to PE with hypoxia. Discussed pt's HPI and available diagnostic results thus far. Expressed concerns for needed admission. Consultant will evaluate for admission. Vale Gresham MD      ADMISSION NOTE:  Patient is being admitted to the hospital by Dr. Sierra Fletcher. The results of their tests and reasons for their admission have been discussed with them and/or available family. They convey agreement and understanding for the need to be admitted and for their admission diagnosis.    Vale Gresham MD           MEDS GIVEN         PROCEDURES     Medications Administered         amLODIPine (NORVASC) tablet 10 mg Admin Date  12/07/2023 Action  Given Dose  10 mg Route  Oral Administered By  Gregorio Guzman        amLODIPine (NORVASC) tablet 10 mg Admin Date  12/08/2023 Action  Given Dose  10 mg Route  Oral Administered By  Elsworth Bloch, RN        enoxaparin (LOVENOX) injection 120 mg Admin Date  12/07/2023 Action  Given Dose  120 mg Route  SubCUTAneous Administered By  Gena Wolff RN        enoxaparin (LOVENOX) injection 120 mg Admin Date  12/07/2023 Action  Given Dose  120 mg Route  SubCUTAneous Administered By  Jair Rivero RN        enoxaparin (LOVENOX) injection 120 mg Admin Date  12/08/2023 Action  Given Dose  120 mg Route  SubCUTAneous Administered By  Elsworth Bloch, RN        enoxaparin (LOVENOX) injection 120 mg Admin Date  12/08/2023 Action  Given Dose  120 mg Route  SubCUTAneous Administered By  Glenny Ahuja RN        ferrous sulfate (IRON 325) tablet 325 mg Admin Date  12/07/2023 Action  Given Dose  325 mg Route  Oral Administered By  Gregorio Guzman        ferrous sulfate (IRON 325) tablet 325 mg Admin Date  12/08/2023 Action  Given Dose  325 mg Route  Oral Administered By  Elsworth Bloch, RN        iopamidol (ISOVUE-370) 76 % injection 100 mL Admin Date  12/07/2023 Action  Given Dose  100 mL Route  IntraVENous Administered By  [facility] [employer],

## 2023-12-07 NOTE — PLAN OF CARE
Problem: Occupational Therapy - Adult  Goal: By Discharge: Performs self-care activities at highest level of function for planned discharge setting. See evaluation for individualized goals. Description: FUNCTIONAL STATUS PRIOR TO ADMISSION:  ambulated with SPC outside of the home and family at bedside stated that they feel pt should use RW, stood to shower and able to step over bath tub, leaves shoes tied and slides them on, performed ADLs on her own, occasionally drives   ,  ,  ,  ,  ,  ,  ,  ,  ,  ,       HOME SUPPORT: Patient lived with son and daughter whom are supportive and can assist.    Occupational Therapy Goals:  Initiated 12/7/2023  1. Patient will perform grooming with Modified [place] within 7 day(s). 2.  Patient will perform upper body dressing and lower body dressing with Modified [place] within 7 day(s). 3.  Patient will perform toileting with Modified [place] within 7 day(s). 4.  Patient will perform toilet transfers with Modified [place]  within 7 day(s). Outcome: Not Progressing   OCCUPATIONAL THERAPY EVALUATION    Patient: Emerson Muhammad (15 y.o. female)  Date: 12/7/2023  Primary Diagnosis: Pulmonary embolism on right [facility]) [I26.99]         Precautions:                    ASSESSMENT :  The patient is limited by decreased functional mobility, independence in ADLs, strength, activity tolerance, balance. Based on the impairments listed above pt is performing mobility and ADLS at a CGA level overall. She needs support of RW at this time for safe mobility and balance. Min assist needed to slide on tied tennis shoes (pt leaves shoes tie). SOB on exertion and cues needed for pacing at times. RR was in the upper 30s with activity this session. She was able to mobilize down ross to bathroom with CGA. Performed toileting with CGA managing depends as well as standing to wash/dry hands.   Pt is performing ADLs at a min assist to CGA level overall and recommend HHOT with

## 2023-12-07 NOTE — PROGRESS NOTES
1415- Received report from Marissa Morris, Student RN . Report included the following information ED Encounter Summary, ED SBAR, Adult Overview, Intake/Output, MAR, Recent Results, Cardiac Rhythm NSR, Alarm Parameters, and Neuro Assessment. 1515-Patient arrived to the floor via stretcher with RN. Patient is alert and orient x 4.  Patient desats on room air 88-90%, 2 L/min nc

## 2023-12-07 NOTE — ED NOTES
Bed status changed, pt to remain in ED. Assumed care of pt at this time.      Salvador Valles RN  12/07/23 1704

## 2023-12-07 NOTE — H&P
Hospitalist Admission Note    NAME:   Daysi Green   : 1945   MRN: [id number]     Date/Time: 2023 3:29 PM    Patient PCP: GARTH Gautam NP    ______________________________________________________________________  Given the patient's current clinical presentation, I have a high level of concern for decompensation if discharged from the emergency department. Complex decision making was performed, which includes reviewing the patient's available past medical records, laboratory results, and x-ray films. My assessment of this patient's clinical condition and my plan of care is as follows. Assessment / Plan:    Pulmonary embolism-patient presents with vomiting symptomatology based on patient's clinical presentation as well as physical examination consistent with pulmonary embolism, patient brenda hemodynamically stable, status post therapeutic Lovenox  Continue therapeutic Lovenox  Trend cardiac enzymes  Obtain lower extremity Dopplers  Obtain transthoracic echo to rule out right heart strain  Obtain pulmonology consult for evaluation    Hypertension-continue current antihypertensive medications  Hydralazine as needed for Systolic Blood pressure over 160    Vitamin D deficiency-continue current medications    Iron deficiency anemia-continue current medications    Prophylaxis-Lovenox  FEN-cardiac diet, potassium and magnesium  Full code, will clarify about regular centimeter, admitted for medical management                  Medical Decision Making:   I personally reviewed labs: CBC, CMP, magnesium  I personally reviewed imaging:CTA chest PE protocol  I personally reviewed EKG:  Toxic drug monitoring: Lovenox, monitoring for signs symptoms of bleeding, daily CBCs, monitor for heparin-induced thrombocytopenia  Discussed case with: ED provider. After discussion I am in agreement that acuity of patient's medical condition necessitates hospital stay.       Code Status: Full code  DVT Prophylaxis: Lovenox  Baseline: Independent with ADLs    Subjective:   CHIEF COMPLAINT: Right sided CP/Abdominal pain    HISTORY OF PRESENT ILLNESS:     Krystal Weller is a 78 y.o.  female with PMHx significant for multiple comorbidities presents to [facility] with complaints of right-sided chest pain/abdominal pain. Patient states she was in her usual state of health until earlier this a.m. which started since then sudden onset intermittent right-sided abdominal/chest discomfort, moderate intensity with no aggravating or alleviating factors following which patient presented to the ED. Patient denies any fevers chills nausea vomiting lightheadedness dizziness dyspnea orthopnea paroxysmal nocturnal dyspnea headache focal weakness loss sensation auditory or visual symptoms stool or urinary complaints or any other associated symptoms. Patient endorses no recent sick contacts or travel activity  We were asked to admit for work up and evaluation of the above problems. No past medical history on file. HTN, vitamin D deficiency, iron deficiency anemia    No past surgical history on file. No pertinent past surgical history  Patient lives at home, no history of smoking/EtOH/substance abuse  Social History     Tobacco Use    Smoking status: Never    Smokeless tobacco: Never   Substance Use Topics    Alcohol use: Not on file        Family History   Problem Relation Age of Onset    Breast Cancer Sister         under 48       No Known Allergies     Prior to Admission medications    Medication Sig Start Date End Date Taking?  Authorizing Provider   ibuprofen (ADVIL;MOTRIN) 600 MG tablet Take 1 tablet by mouth every 6 hours as needed for Pain 10/23/23 11/2/23  Griselda Charles, MD   amLODIPine (NORVASC) 10 MG tablet Take by mouth daily 3/9/14   Automatic Reconciliation, Ar   vitamin D (CHOLECALCIFEROL) 25 MCG (1000 UT) TABS tablet Take 1 tablet by mouth daily    Automatic Reconciliation, Ar   ferrous

## 2023-12-07 NOTE — ED TRIAGE NOTES
Patient wheeled into triage from waiting room with complaint of lower right quadrant pain. Patient reports pain began yesterday afternoon in her right shoulder, she massaged it and then it moved to her right lower quadrant. Patient reports taking tylenol for pain with little relief.

## 2023-12-08 LAB
ANION GAP SERPL CALC-SCNC: 4 MMOL/L (ref 5–15)
BASOPHILS # BLD: 0 K/UL (ref 0–0.1)
BASOPHILS NFR BLD: 1 % (ref 0–1)
BUN SERPL-MCNC: 19 MG/DL (ref 6–20)
BUN/CREAT SERPL: 18 (ref 12–20)
CALCIUM SERPL-MCNC: 9.2 MG/DL (ref 8.5–10.1)
CHLORIDE SERPL-SCNC: 107 MMOL/L (ref 97–108)
CO2 SERPL-SCNC: 30 MMOL/L (ref 21–32)
CREAT SERPL-MCNC: 1.06 MG/DL (ref 0.55–1.02)
DIFFERENTIAL METHOD BLD: ABNORMAL
EOSINOPHIL # BLD: 0.2 K/UL (ref 0–0.4)
EOSINOPHIL NFR BLD: 3 % (ref 0–7)
ERYTHROCYTE [DISTWIDTH] IN BLOOD BY AUTOMATED COUNT: 14.4 % (ref 11.5–14.5)
GLUCOSE SERPL-MCNC: 108 MG/DL (ref 65–100)
HCT VFR BLD AUTO: 34 % (ref 35–47)
HGB BLD-MCNC: 10.8 G/DL (ref 11.5–16)
IMM GRANULOCYTES # BLD AUTO: 0 K/UL (ref 0–0.04)
IMM GRANULOCYTES NFR BLD AUTO: 0 % (ref 0–0.5)
LYMPHOCYTES # BLD: 1.7 K/UL (ref 0.8–3.5)
LYMPHOCYTES NFR BLD: 27 % (ref 12–49)
MCH RBC QN AUTO: 28.3 PG (ref 26–34)
MCHC RBC AUTO-ENTMCNC: 31.8 G/DL (ref 30–36.5)
MCV RBC AUTO: 89.2 FL (ref 80–99)
MONOCYTES # BLD: 0.7 K/UL (ref 0–1)
MONOCYTES NFR BLD: 11 % (ref 5–13)
NEUTS SEG # BLD: 3.6 K/UL (ref 1.8–8)
NEUTS SEG NFR BLD: 59 % (ref 32–75)
NRBC # BLD: 0 K/UL (ref 0–0.01)
NRBC BLD-RTO: 0 PER 100 WBC
PLATELET # BLD AUTO: 150 K/UL (ref 150–400)
PMV BLD AUTO: 10 FL (ref 8.9–12.9)
POTASSIUM SERPL-SCNC: 4 MMOL/L (ref 3.5–5.1)
RBC # BLD AUTO: 3.81 M/UL (ref 3.8–5.2)
SODIUM SERPL-SCNC: 141 MMOL/L (ref 136–145)
WBC # BLD AUTO: 6.2 K/UL (ref 3.6–11)

## 2023-12-08 PROCEDURE — 80048 BASIC METABOLIC PNL TOTAL CA: CPT

## 2023-12-08 PROCEDURE — 6370000000 HC RX 637 (ALT 250 FOR IP): Performed by: INTERNAL MEDICINE

## 2023-12-08 PROCEDURE — 2060000000 HC ICU INTERMEDIATE R&B

## 2023-12-08 PROCEDURE — 36415 COLL VENOUS BLD VENIPUNCTURE: CPT

## 2023-12-08 PROCEDURE — 2700000000 HC OXYGEN THERAPY PER DAY

## 2023-12-08 PROCEDURE — 97116 GAIT TRAINING THERAPY: CPT

## 2023-12-08 PROCEDURE — 97535 SELF CARE MNGMENT TRAINING: CPT

## 2023-12-08 PROCEDURE — 6360000002 HC RX W HCPCS: Performed by: INTERNAL MEDICINE

## 2023-12-08 PROCEDURE — 85025 COMPLETE CBC W/AUTO DIFF WBC: CPT

## 2023-12-08 PROCEDURE — 2580000003 HC RX 258: Performed by: INTERNAL MEDICINE

## 2023-12-08 PROCEDURE — 97530 THERAPEUTIC ACTIVITIES: CPT

## 2023-12-08 RX ADMIN — FERROUS SULFATE TAB 325 MG (65 MG ELEMENTAL FE) 325 MG: 325 (65 FE) TAB at 08:53

## 2023-12-08 RX ADMIN — SODIUM CHLORIDE, PRESERVATIVE FREE 10 ML: 5 INJECTION INTRAVENOUS at 21:36

## 2023-12-08 RX ADMIN — Medication 1000 UNITS: at 08:54

## 2023-12-08 RX ADMIN — ENOXAPARIN SODIUM 120 MG: 150 INJECTION SUBCUTANEOUS at 21:35

## 2023-12-08 RX ADMIN — ENOXAPARIN SODIUM 120 MG: 150 INJECTION SUBCUTANEOUS at 11:12

## 2023-12-08 RX ADMIN — AMLODIPINE BESYLATE 10 MG: 5 TABLET ORAL at 08:53

## 2023-12-08 RX ADMIN — SODIUM CHLORIDE, PRESERVATIVE FREE 10 ML: 5 INJECTION INTRAVENOUS at 08:54

## 2023-12-08 NOTE — PLAN OF CARE
Problem: Physical Therapy - Adult  Goal: By Discharge: Performs mobility at highest level of function for planned discharge setting. See evaluation for individualized goals. Description: FUNCTIONAL STATUS PRIOR TO ADMISSION: Patient was modified independent using a single point cane for functional mobility. HOME SUPPORT PRIOR TO ADMISSION: The patient lived with her son and daughter who assist her when needed. Physical Therapy Goals  Initiated 12/7/2023  1. Patient will move from supine to sit and sit to supine in bed with independence within 7 day(s). 2.  Patient will perform sit to stand with modified independence within 7 day(s). 3.  Patient will transfer from bed to chair and chair to bed with modified independence using the least restrictive device within 7 day(s). 4.  Patient will ambulate with modified independence for 120 feet with the least restrictive device within 7 day(s). 5.  Patient will ascend/descend 4 stairs with 1 handrail(s) with modified independence within 7 day(s). 12/7/2023 1249 by Concepcion Stephenson PT  Outcome: Progressing     Problem: Discharge Planning  Goal: Discharge to home or other facility with appropriate resources  Outcome: Progressing     Problem: ABCDS Injury Assessment  Goal: Absence of physical injury  12/8/2023 0229 by Wanda Valenzuela RN  Outcome: Progressing  12/7/2023 1938 by Ananya Ortiz RN  Outcome: Progressing     Problem: Safety - Adult  Goal: Free from fall injury  Outcome: Progressing     Problem: Respiratory - Adult  Goal: Achieves optimal ventilation and oxygenation  Outcome: Progressing     Problem: Chronic Conditions and Co-morbidities  Goal: Patient's chronic conditions and co-morbidity symptoms are monitored and maintained or improved  Outcome: Progressing     Problem: Occupational Therapy - Adult  Goal: By Discharge: Performs self-care activities at highest level of function for planned discharge setting.   See evaluation for

## 2023-12-08 NOTE — CARE COORDINATION
Care Management Initial Assessment       RUR: 9% - \"low risk\"  Readmission? No  1st IM letter given? Yes - provided by Patient Access Team 23  1st  letter given: N/A    Update - 2:37 PM: RW approved via [address] [place] [address]; rep to deliver it to pt's room this afternoon. [email or web address]'s information reflected in AVS. CM unable to secure [address] secondary to complex payor. Pt will need OP therapy script as alternative plan for d/c.    Update - 1:32 PM: 10 of 12 HH referrals denied due to insurance coverage. CM anticipates continued barriers securing HH secondary to complex payor. If CM is unable to secure [address], pt will need to d/c with script for OP therapy. Referral for RW pending via [employer] with [email or web address].    Update - 1:02 PM: Several HH referrals denied due to being out-of-network/max capacity for managed care; several remain pending updates. CM actively working to upload orders for [employer] via [employer]. Initial note: Chart reviewed, IDR completed. ALINE anticipated for today vs tomorrow pending MD clearance. PT/OT recommendations for [address] & DME (RW) acknowledged. CM met with pt & sister Prem Soto: [phone]) at bedside, introduced role, & confirmed demographic information is up-to-date in the chart. Pt resides in single story home with her son Darryle Pod: [phone]) & daughter Erin Ivans: [phone]). PCP associated with [facility] Pedrito Jackson; pt unable to recall name of new provider. Pt specified PCP listed on chart is associated with [facility] but is not her current provider. Pt reported being independent with ADL's at baseline. Pt occasionally drives but reported either her son or grandson will provide transportation at the time of d/c. Pt reported having access to DME in the form of a cane. Pt declined prior Hx of utilizing HH/SNF/IPR interventions. Preferred pharmacy confirmed as the [facility] off of [address] [facility] ([phone]).     CM discussed therapy team's recommendations for d/c re: [address] [facility] &

## 2023-12-08 NOTE — PLAN OF CARE
Problem: Physical Therapy - Adult  Goal: By Discharge: Performs mobility at highest level of function for planned discharge setting. See evaluation for individualized goals. Description: FUNCTIONAL STATUS PRIOR TO ADMISSION: Patient was modified independent using a single point cane for functional mobility. HOME SUPPORT PRIOR TO ADMISSION: The patient lived with her son and daughter who assist her when needed. Physical Therapy Goals  Initiated 12/7/2023  1. Patient will move from supine to sit and sit to supine in bed with independence within 7 day(s). 2.  Patient will perform sit to stand with modified independence within 7 day(s). 3.  Patient will transfer from bed to chair and chair to bed with modified independence using the least restrictive device within 7 day(s). 4.  Patient will ambulate with modified independence for 120 feet with the least restrictive device within 7 day(s). 5.  Patient will ascend/descend 4 stairs with 1 handrail(s) with modified independence within 7 day(s). Outcome: Progressing     PHYSICAL THERAPY TREATMENT    Patient: Patricia Flores (72 y.o. female)  Date: 12/8/2023  Diagnosis: TAPIA (dyspnea on exertion) [R06.09]  Hypoxia [R09.02]  Pulmonary embolism on right ([address]) [I26.99]  Acute right flank pain [R10.9]  Acute pulmonary embolism without acute cor pulmonale, unspecified pulmonary embolism type ([address]) [I26.99] Pulmonary embolism on right [facility])      Precautions:                      ASSESSMENT:  Patient continues to benefit from skilled PT services and is slowly progressing towards goals. Received patient sitting up in bed on 1L oxygen, sats 97% at rest. Patient required minimum assistance for supine-sit transfer and multiple rest breaks along the way due to SOB. Performed functional transfers with contact guard assist, cuing needed each time for proper hand placement. Once sitting EOB her balance was intact.  Ambulated to the bathroom and around her room on room air, sats dropped to 80% afterward but quickly recovered to 93%. Provided education on seated leg and arm exercises for her to complete throughout the day for strengthening as well as working on her cardiovascular endurance. At end of session patient left sitting up in chair with chair alarm activated, call bell within reach, no complaints. Patient left on room air, notified nurse. PLAN:  Patient continues to benefit from skilled intervention to address the above impairments. Continue treatment per established plan of care. Recommendation for discharge: (in order for the patient to meet his/her long term goals): Therapy 2 days/week in the home    Other factors to consider for discharge:  decreased activity tolerance    IF patient discharges home will need the following DME: rolling walker       SUBJECTIVE:   Patient stated, \"I have been taking my oxygen off when I have to go to the bathroom. \"    OBJECTIVE DATA SUMMARY:   Critical Behavior:  Orientation  Overall Orientation Status: Within Functional Limits  Cognition  Overall Cognitive Status: WFL    Functional Mobility Training:  Bed Mobility:  Bed Mobility Training  Bed Mobility Training: Yes  Interventions: Verbal cues  Supine to Sit: Minimum assistance; Additional time; Other (comment) (HOB elevated)  Scooting: Contact-guard assistance  Transfers:  Transfer Training  Transfer Training: Yes  Interventions: Safety awareness training;Verbal cues  Sit to Stand: Contact-guard assistance  Stand to Sit: Contact-guard assistance  Toilet Transfer: Contact-guard assistance  Balance:  Balance  Sitting: Intact  Standing: Impaired  Standing - Static: Good;Constant support  Standing - Dynamic: Fair;Constant support   Ambulation/Gait Training:     Gait  Overall Level of Assistance: Contact-guard assistance  Distance (ft): 50 Feet (15, 15, 20)  Assistive Device: Gait belt;Walker, rolling  Base of Support: Widened  Speed/Yasmin: Shuffled; Slow  Step Length: Right

## 2023-12-08 NOTE — PROGRESS NOTES
Pt sleeping quietly in bed in NAD. Resp rate is even and unlabored, VSS. Daughter asleep at [facility].  Care ongoing

## 2023-12-08 NOTE — PROGRESS NOTES
End of Shift Note    Bedside shift change report given to Miguel Angel Damon RN (oncoming nurse) by Devin Petersen RN (offgoing nurse). Report included the following information SBAR, Kardex, ED Summary, Intake/Output, MAR, Recent Results, Med Rec Status, Cardiac Rhythm  , and Alarm Parameters     Shift worked:  7p     Shift summary and any significant changes:            Concerns for physician to address:       Zone phone for oncoming shift:          Activity:     Number times ambulated in hallways past shift: 0  Number of times OOB to chair past shift: 0    Cardiac:   Cardiac Monitoring: Yes           Access:  Current line(s): PIV     Genitourinary:   Urinary status: voiding    Respiratory:      Chronic home O2 use?: NO  Incentive spirometer at bedside: NO       GI:     Current diet:  ADULT DIET; Regular; 5 carb choices (75 gm/meal);  Low Fat/Low Chol/High Fiber/2 gm Na  Passing flatus: YES  Tolerating current diet: YES       Pain Management:   Patient states pain is manageable on current regimen: YES    Skin:     Interventions: increase time out of bed and limit briefs    Patient Safety:  Fall Score:    Interventions: bed/chair alarm, gripper socks, pt to call before getting OOB, and stay with me (per policy)       Length of Stay:  Expected LOS: 2  Actual LOS: 1      Devin Petersen RN

## 2023-12-08 NOTE — PLAN OF CARE
Problem: Physical Therapy - Adult  Goal: By Discharge: Performs mobility at highest level of function for planned discharge setting. See evaluation for individualized goals. Description: FUNCTIONAL STATUS PRIOR TO ADMISSION: Patient was modified independent using a single point cane for functional mobility. HOME SUPPORT PRIOR TO ADMISSION: The patient lived with her son and daughter who assist her when needed. Physical Therapy Goals  Initiated 12/7/2023  1. Patient will move from supine to sit and sit to supine in bed with independence within 7 day(s). 2.  Patient will perform sit to stand with modified independence within 7 day(s). 3.  Patient will transfer from bed to chair and chair to bed with modified independence using the least restrictive device within 7 day(s). 4.  Patient will ambulate with modified independence for 120 feet with the least restrictive device within 7 day(s). 5.  Patient will ascend/descend 4 stairs with 1 handrail(s) with modified independence within 7 day(s). 12/8/2023 0553 by Alex Alvarado PT  Outcome: Progressing     Problem: Occupational Therapy - Adult  Goal: By Discharge: Performs self-care activities at highest level of function for planned discharge setting. See evaluation for individualized goals. Description: FUNCTIONAL STATUS PRIOR TO ADMISSION:  ambulated with SPC outside of the home and family at bedside stated that they feel pt should use RW, stood to shower and able to step over bath tub, leaves shoes tied and slides them on, performed ADLs on her own, occasionally drives   ,  ,  ,  ,  ,  ,  ,  ,  ,  ,       HOME SUPPORT: Patient lived with son and daughter whom are supportive and can assist.    Occupational Therapy Goals:  Initiated 12/7/2023  1. Patient will perform grooming with Modified [place] within 7 day(s). 2.  Patient will perform upper body dressing and lower body dressing with Modified [place] within 7 day(s).   3.  Patient

## 2023-12-08 NOTE — PLAN OF CARE
Problem: Occupational Therapy - Adult  Goal: By Discharge: Performs self-care activities at highest level of function for planned discharge setting. See evaluation for individualized goals. Description: FUNCTIONAL STATUS PRIOR TO ADMISSION:  ambulated with SPC outside of the home and family at bedside stated that they feel pt should use RW, stood to shower and able to step over bath tub, leaves shoes tied and slides them on, performed ADLs on her own, occasionally drives   ,  ,  ,  ,  ,  ,  ,  ,  ,  ,       HOME SUPPORT: Patient lived with son and daughter whom are supportive and can assist.    Occupational Therapy Goals:  Initiated 12/7/2023  1. Patient will perform grooming with Modified [place] within 7 day(s). 2.  Patient will perform upper body dressing and lower body dressing with Modified [place] within 7 day(s). 3.  Patient will perform toileting with Modified [place] within 7 day(s). 4.  Patient will perform toilet transfers with Modified [place]  within 7 day(s). Outcome: Progressing   OCCUPATIONAL THERAPY TREATMENT  Patient: Yuli Landaverde (89 y.o. female)  Date: 12/8/2023  Primary Diagnosis: TAPIA (dyspnea on exertion) [R06.09]  Hypoxia [R09.02]  Pulmonary embolism on right ([address]) [I26.99]  Acute right flank pain [R10.9]  Acute pulmonary embolism without acute cor pulmonale, unspecified pulmonary embolism type ([address]) [I26.99]       Precautions:                  Chart, occupational therapy assessment, plan of care, and goals were reviewed. ASSESSMENT  Patient continues to benefit from skilled OT services and is progressing towards goals. Pt tolerated OT session well. Pt demonstrates improved activity tolerance, standing tolerance, and standing balance. Tolerated standing for ~10 minutes, primarily without UE support. SpO2 desaturation noted to 85% post ADLs, quickly recovering to 94% with seated rest break and PLB.  Pt presents at a grossly SBA/CGA level for functional

## 2023-12-08 NOTE — PROGRESS NOTES
Pt sitting up in bed, oriented x4, answers questions appropriately. Daughter at [facility], asking multiple questions, concerns addressed. BBS auscultated, diminished throughout all fields, complicated by body habitus.   Denies SOB, HA, CP or other discomforts at this time

## 2023-12-08 NOTE — PROGRESS NOTES
Pulse oximetry assessment   92% at rest on room air (if 88% or less, skip next steps)  86% while ambulating on room air  97% at rest on 1LPM  91% while ambulating on 1LPM

## 2023-12-09 VITALS
SYSTOLIC BLOOD PRESSURE: 130 MMHG | RESPIRATION RATE: 20 BRPM | OXYGEN SATURATION: 97 % | HEART RATE: 61 BPM | HEIGHT: 64 IN | WEIGHT: 277.4 LBS | BODY MASS INDEX: 47.36 KG/M2 | TEMPERATURE: 97.8 F | DIASTOLIC BLOOD PRESSURE: 72 MMHG

## 2023-12-09 PROCEDURE — 6360000002 HC RX W HCPCS: Performed by: INTERNAL MEDICINE

## 2023-12-09 PROCEDURE — 2580000003 HC RX 258: Performed by: INTERNAL MEDICINE

## 2023-12-09 PROCEDURE — 6370000000 HC RX 637 (ALT 250 FOR IP): Performed by: INTERNAL MEDICINE

## 2023-12-09 RX ADMIN — AMLODIPINE BESYLATE 10 MG: 5 TABLET ORAL at 08:34

## 2023-12-09 RX ADMIN — FERROUS SULFATE TAB 325 MG (65 MG ELEMENTAL FE) 325 MG: 325 (65 FE) TAB at 08:34

## 2023-12-09 RX ADMIN — Medication 1000 UNITS: at 08:34

## 2023-12-09 RX ADMIN — ENOXAPARIN SODIUM 120 MG: 150 INJECTION SUBCUTANEOUS at 08:44

## 2023-12-09 RX ADMIN — SODIUM CHLORIDE, PRESERVATIVE FREE 10 ML: 5 INJECTION INTRAVENOUS at 08:36

## 2023-12-09 ASSESSMENT — ENCOUNTER SYMPTOMS
NAUSEA: 0
DIARRHEA: 0
BACK PAIN: 1
VOMITING: 0
SHORTNESS OF BREATH: 0
ABDOMINAL PAIN: 1

## 2023-12-09 NOTE — DISCHARGE SUMMARY
Hospitalist Discharge Summary     Patient ID:  Anil Yanez  [id number]  78 y.o.  1945  12/7/2023    PCP on record: GARTH Woods NP    Admit date: 12/7/2023  Discharge date and time: 12/9/2023    DISCHARGE DIAGNOSIS:    Acute hypoxemic respiratory failure:  Acute Pulmonary embolism:  Eliquis on discharge  Normal lower extremity Dopplers  Normal transthoracic echo   PCP Followup     Essential Hypertension:  Continue PTA medications     Vitamin D deficiency:  Continue PTA medications     Iron deficiency anemia:  Continue PTA medications    CONSULTATIONS:  IP CONSULT TO HOSPITALIST  IP CONSULT TO PULMONOLOGY  IP CONSULT TO CASE MANAGEMENT  IP CONSULT TO CASE MANAGEMENT  IP CONSULT TO CASE MANAGEMENT    Excerpted HPI from H&P of Vish Oakley DO:  Nora Branch is a 78 y.o.  female with PMHx significant for multiple comorbidities presents to [facility] with complaints of right-sided chest pain/abdominal pain. Patient states she was in her usual state of health until earlier this a.m. which started since then sudden onset intermittent right-sided abdominal/chest discomfort, moderate intensity with no aggravating or alleviating factors following which patient presented to the ED. Patient denies any fevers chills nausea vomiting lightheadedness dizziness dyspnea orthopnea paroxysmal nocturnal dyspnea headache focal weakness loss sensation auditory or visual symptoms stool or urinary complaints or any other associated symptoms. Patient endorses no recent sick contacts or travel activity  We were asked to admit for work up and evaluation of the above problems. ______________________________________________________________________  DISCHARGE SUMMARY/HOSPITAL COURSE:  for full details see H&P, daily progress notes, labs, consult notes. Patient presented to the hospital with right sided chest pain and shortness of breath.  Patient admitted and treated for

## 2023-12-09 NOTE — PLAN OF CARE
Problem: Discharge Planning  Goal: Discharge to home or other facility with appropriate resources  Outcome: Adequate for Discharge     Problem: ABCDS Injury Assessment  Goal: Absence of physical injury  Outcome: Adequate for Discharge     Problem: Safety - Adult  Goal: Free from fall injury  Outcome: Adequate for Discharge     Problem: Respiratory - Adult  Goal: Achieves optimal ventilation and oxygenation  Outcome: Adequate for Discharge     Problem: Chronic Conditions and Co-morbidities  Goal: Patient's chronic conditions and co-morbidity symptoms are monitored and maintained or improved  Outcome: Adequate for Discharge

## 2023-12-09 NOTE — PROGRESS NOTES
Pulse oximetry assessment   95% at rest on room air (if 88% or less, skip next steps)  86% while ambulating on room air  99% at rest on 2LPM  96% while ambulating on 2LPM

## 2023-12-09 NOTE — PROGRESS NOTES
IV removed. Tele discontinued. Home O2 and walker at bedside. Discharge education completed. Pt denies questions/concerns at this time. Pt with all belongings in wheelchair to front door for discharge home. [facility], RN.

## 2023-12-09 NOTE — PROGRESS NOTES
END OF SHIFT NOTE    Assumed care of pt. Bedside shift change report received from Rebekah Laboy (offgoing nurse). Report included the following information Nurse Handoff Report and Adult Overview          Shift worked:                          [phone]     Shift summary and any significant changes:     No significant changes. Concerns for physician to address:  Pt will require 1L upon discharge                 Bedside shift change report given to Liza Rojas (oncoming nurse) by Stephanie Lai RN (offgoing nurse).  Report included the following information Nurse Handoff Report, Adult Overview, and Event Log

## 2023-12-09 NOTE — CARE COORDINATION
CM Note: I have ordered home 02 from [address] [place] [address] on Saturday. Also additional referrals  placed for [facility] since she has not have an accepting [facility] yet. 4:12 pm [employer] will provide 02 and will deliver a portable concentrator to patient tonight so she can go home. I  am still working on Home health. She has a rolling walker already    Her Eliquis will be  $47.00 a month and I will give her a discount card also for 1 month.        Silvino Chase RN CM

## 2023-12-09 NOTE — CARE COORDINATION
12/09/23 3005   Services At/After Discharge   Transition of Care Consult (CM Consult) Home Health   Mode of Transport at Discharge Other (see comment)   Confirm Follow Up Transport Family   Condition of Participation: Discharge Planning   The Plan for Transition of Care is related to the following treatment goals: PCP, specialist, Home 02     She has a rolling walker. She will have an Eliquis discount card. [employer] will deliver   02 concentrator for her to use during the weekend and then they will deliver a permanent concentrator on Monday and tanks.     Jayden Duenas RN CM   67

## 2023-12-09 NOTE — PLAN OF CARE
1900: Bedside and Verbal shift change report given to Yusef Walsh RN (oncoming nurse) by Vinita Martinez RN (offgoing nurse). Report included the following information Nurse Handoff Report. Problem: Discharge Planning  Goal: Discharge to home or other facility with appropriate resources  12/9/2023 0029 by Renetta Lyn RN  Outcome: Progressing  12/8/2023 1739 by Heather Valderrama RN  Outcome: Progressing     Problem: Safety - Adult  Goal: Free from fall injury  12/9/2023 0029 by Renetta Lyn RN  Outcome: Progressing  12/8/2023 1739 by Heather Valderrama RN  Outcome: Progressing     Problem: Chronic Conditions and Co-morbidities  Goal: Patient's chronic conditions and co-morbidity symptoms are monitored and maintained or improved  12/9/2023 0029 by Renetta Lyn RN  Outcome: Progressing  12/8/2023 1739 by Heather Valderrama RN  Outcome: Progressing     End of Shift Note    Bedside shift change report given to Wood Medeiros RN (oncoming nurse) by Renetta Lyn RN (offgoing nurse). Report included the following information SBAR    Shift worked:  3830-2107     Shift summary and any significant changes:     No significant changes overnight. Concerns for physician to address:  Patient would like to know if she'll take PO blood thinners when discharged. Zone phone for oncoming shift:          Activity:     Number times ambulated in hallways past shift: 0  Number of times OOB to chair past shift: 2    Cardiac:   Cardiac Monitoring: Yes           Access:  Current line(s): PIV     Genitourinary:   Urinary status: voiding    Respiratory:      Chronic home O2 use?: NO  Incentive spirometer at bedside: NO       GI:     Current diet:  ADULT DIET; Regular; 5 carb choices (75 gm/meal);  Low Fat/Low Chol/High Fiber/2 gm Na  Passing flatus: YES  Tolerating current diet: YES       Pain Management:   Patient states pain is manageable on current regimen: YES    Skin:     Interventions: increase time out of bed    Patient Safety:  Fall Score: EENMT

## 2024-01-04 ENCOUNTER — HOSPITAL ENCOUNTER (OUTPATIENT)
Facility: HOSPITAL | Age: 79
Discharge: HOME | End: 2024-01-04
Payer: MEDICARE

## 2024-01-04 DIAGNOSIS — M81.0 OSTEOPOROSIS, UNSPECIFIED OSTEOPOROSIS TYPE, UNSPECIFIED PATHOLOGICAL FRACTURE PRESENCE: ICD-10-CM

## 2024-01-04 DIAGNOSIS — Z78.0 ASYMPTOMATIC MENOPAUSAL STATE: ICD-10-CM

## 2024-01-04 PROCEDURE — 77080 DXA BONE DENSITY AXIAL: CPT

## 2024-04-08 ENCOUNTER — APPOINTMENT (OUTPATIENT)
Facility: HOSPITAL | Age: 79
End: 2024-04-08
Payer: MEDICARE

## 2024-04-08 ENCOUNTER — HOSPITAL ENCOUNTER (INPATIENT)
Facility: HOSPITAL | Age: 79
LOS: 3 days | Discharge: HOME | End: 2024-04-12
Attending: EMERGENCY MEDICINE | Admitting: STUDENT IN AN ORGANIZED HEALTH CARE EDUCATION/TRAINING PROGRAM
Payer: MEDICARE

## 2024-04-08 DIAGNOSIS — E27.49 ADRENAL HEMORRHAGE (HCC): Primary | ICD-10-CM

## 2024-04-08 DIAGNOSIS — G47.33 OSA (OBSTRUCTIVE SLEEP APNEA): ICD-10-CM

## 2024-04-08 LAB
ALBUMIN SERPL-MCNC: 3.1 G/DL (ref 3.5–5)
ALBUMIN/GLOB SERPL: 0.8 (ref 1.1–2.2)
ALP SERPL-CCNC: 68 U/L (ref 45–117)
ALT SERPL-CCNC: 12 U/L (ref 12–78)
ANION GAP SERPL CALC-SCNC: 6 MMOL/L (ref 5–15)
AST SERPL-CCNC: 11 U/L (ref 15–37)
BASOPHILS # BLD: 0 K/UL (ref 0–0.1)
BASOPHILS NFR BLD: 1 % (ref 0–1)
BILIRUB SERPL-MCNC: 0.6 MG/DL (ref 0.2–1)
BUN SERPL-MCNC: 21 MG/DL (ref 6–20)
BUN/CREAT SERPL: 20 (ref 12–20)
CALCIUM SERPL-MCNC: 9.3 MG/DL (ref 8.5–10.1)
CHLORIDE SERPL-SCNC: 108 MMOL/L (ref 97–108)
CO2 SERPL-SCNC: 28 MMOL/L (ref 21–32)
CREAT SERPL-MCNC: 1.06 MG/DL (ref 0.55–1.02)
DIFFERENTIAL METHOD BLD: ABNORMAL
EOSINOPHIL # BLD: 0.1 K/UL (ref 0–0.4)
EOSINOPHIL NFR BLD: 2 % (ref 0–7)
ERYTHROCYTE [DISTWIDTH] IN BLOOD BY AUTOMATED COUNT: 14.6 % (ref 11.5–14.5)
GLOBULIN SER CALC-MCNC: 4 G/DL (ref 2–4)
GLUCOSE SERPL-MCNC: 143 MG/DL (ref 65–100)
HCT VFR BLD AUTO: 33.7 % (ref 35–47)
HGB BLD-MCNC: 10.5 G/DL (ref 11.5–16)
IMM GRANULOCYTES # BLD AUTO: 0 K/UL (ref 0–0.04)
IMM GRANULOCYTES NFR BLD AUTO: 0 % (ref 0–0.5)
LIPASE SERPL-CCNC: 24 U/L (ref 13–75)
LYMPHOCYTES # BLD: 2.1 K/UL (ref 0.8–3.5)
LYMPHOCYTES NFR BLD: 36 % (ref 12–49)
MCH RBC QN AUTO: 28.8 PG (ref 26–34)
MCHC RBC AUTO-ENTMCNC: 31.2 G/DL (ref 30–36.5)
MCV RBC AUTO: 92.3 FL (ref 80–99)
MONOCYTES # BLD: 0.5 K/UL (ref 0–1)
MONOCYTES NFR BLD: 9 % (ref 5–13)
NEUTS SEG # BLD: 3 K/UL (ref 1.8–8)
NEUTS SEG NFR BLD: 52 % (ref 32–75)
NRBC # BLD: 0 K/UL (ref 0–0.01)
NRBC BLD-RTO: 0 PER 100 WBC
PLATELET # BLD AUTO: 180 K/UL (ref 150–400)
PMV BLD AUTO: 10.8 FL (ref 8.9–12.9)
POTASSIUM SERPL-SCNC: 3.5 MMOL/L (ref 3.5–5.1)
PROT SERPL-MCNC: 7.1 G/DL (ref 6.4–8.2)
RBC # BLD AUTO: 3.65 M/UL (ref 3.8–5.2)
SODIUM SERPL-SCNC: 142 MMOL/L (ref 136–145)
WBC # BLD AUTO: 5.8 K/UL (ref 3.6–11)

## 2024-04-08 PROCEDURE — 93005 ELECTROCARDIOGRAM TRACING: CPT | Performed by: EMERGENCY MEDICINE

## 2024-04-08 PROCEDURE — 6360000002 HC RX W HCPCS: Performed by: EMERGENCY MEDICINE

## 2024-04-08 PROCEDURE — 83690 ASSAY OF LIPASE: CPT

## 2024-04-08 PROCEDURE — 6360000004 HC RX CONTRAST MEDICATION: Performed by: RADIOLOGY

## 2024-04-08 PROCEDURE — 80053 COMPREHEN METABOLIC PANEL: CPT

## 2024-04-08 PROCEDURE — 36415 COLL VENOUS BLD VENIPUNCTURE: CPT

## 2024-04-08 PROCEDURE — 74177 CT ABD & PELVIS W/CONTRAST: CPT

## 2024-04-08 PROCEDURE — 99285 EMERGENCY DEPT VISIT HI MDM: CPT

## 2024-04-08 PROCEDURE — 85025 COMPLETE CBC W/AUTO DIFF WBC: CPT

## 2024-04-08 PROCEDURE — 96374 THER/PROPH/DIAG INJ IV PUSH: CPT

## 2024-04-08 RX ORDER — KETOROLAC TROMETHAMINE 30 MG/ML
15 INJECTION, SOLUTION INTRAMUSCULAR; INTRAVENOUS ONCE
Status: COMPLETED | OUTPATIENT
Start: 2024-04-08 | End: 2024-04-08

## 2024-04-08 RX ADMIN — KETOROLAC TROMETHAMINE 15 MG: 30 INJECTION, SOLUTION INTRAMUSCULAR; INTRAVENOUS at 23:09

## 2024-04-08 RX ADMIN — IOPAMIDOL 100 ML: 755 INJECTION, SOLUTION INTRAVENOUS at 23:55

## 2024-04-08 ASSESSMENT — PAIN SCALES - GENERAL: PAINLEVEL_OUTOF10: 10

## 2024-04-08 ASSESSMENT — PAIN DESCRIPTION - ORIENTATION: ORIENTATION: RIGHT;UPPER

## 2024-04-08 ASSESSMENT — PAIN DESCRIPTION - LOCATION: LOCATION: ABDOMEN

## 2024-04-08 ASSESSMENT — LIFESTYLE VARIABLES
HOW MANY STANDARD DRINKS CONTAINING ALCOHOL DO YOU HAVE ON A TYPICAL DAY: PATIENT DOES NOT DRINK
HOW OFTEN DO YOU HAVE A DRINK CONTAINING ALCOHOL: NEVER

## 2024-04-09 PROBLEM — K68.3 RETROPERITONEAL HEMATOMA: Status: ACTIVE | Noted: 2024-04-09

## 2024-04-09 LAB
ANION GAP SERPL CALC-SCNC: 2 MMOL/L (ref 5–15)
BASOPHILS # BLD: 0 K/UL (ref 0–0.1)
BASOPHILS NFR BLD: 0 % (ref 0–1)
BUN SERPL-MCNC: 24 MG/DL (ref 6–20)
BUN/CREAT SERPL: 22 (ref 12–20)
CALCIUM SERPL-MCNC: 9.2 MG/DL (ref 8.5–10.1)
CHLORIDE SERPL-SCNC: 108 MMOL/L (ref 97–108)
CO2 SERPL-SCNC: 29 MMOL/L (ref 21–32)
CREAT SERPL-MCNC: 1.07 MG/DL (ref 0.55–1.02)
DIFFERENTIAL METHOD BLD: ABNORMAL
EKG ATRIAL RATE: 55 BPM
EKG DIAGNOSIS: NORMAL
EKG P AXIS: 77 DEGREES
EKG P-R INTERVAL: 146 MS
EKG Q-T INTERVAL: 478 MS
EKG QRS DURATION: 84 MS
EKG QTC CALCULATION (BAZETT): 457 MS
EKG R AXIS: 64 DEGREES
EKG T AXIS: 61 DEGREES
EKG VENTRICULAR RATE: 55 BPM
EOSINOPHIL # BLD: 0 K/UL (ref 0–0.4)
EOSINOPHIL NFR BLD: 0 % (ref 0–7)
ERYTHROCYTE [DISTWIDTH] IN BLOOD BY AUTOMATED COUNT: 14.7 % (ref 11.5–14.5)
GLUCOSE SERPL-MCNC: 135 MG/DL (ref 65–100)
HCT VFR BLD AUTO: 32 % (ref 35–47)
HGB BLD-MCNC: 10.1 G/DL (ref 11.5–16)
IMM GRANULOCYTES # BLD AUTO: 0 K/UL (ref 0–0.04)
IMM GRANULOCYTES NFR BLD AUTO: 0 % (ref 0–0.5)
INR PPP: 1 (ref 0.9–1.1)
LYMPHOCYTES # BLD: 0.6 K/UL (ref 0.8–3.5)
LYMPHOCYTES NFR BLD: 8 % (ref 12–49)
MCH RBC QN AUTO: 29.4 PG (ref 26–34)
MCHC RBC AUTO-ENTMCNC: 31.6 G/DL (ref 30–36.5)
MCV RBC AUTO: 93.3 FL (ref 80–99)
MONOCYTES # BLD: 0.4 K/UL (ref 0–1)
MONOCYTES NFR BLD: 5 % (ref 5–13)
NEUTS SEG # BLD: 7 K/UL (ref 1.8–8)
NEUTS SEG NFR BLD: 87 % (ref 32–75)
NRBC # BLD: 0 K/UL (ref 0–0.01)
NRBC BLD-RTO: 0 PER 100 WBC
NT PRO BNP: 192 PG/ML
PLATELET # BLD AUTO: 176 K/UL (ref 150–400)
PMV BLD AUTO: 10.8 FL (ref 8.9–12.9)
POTASSIUM SERPL-SCNC: 4 MMOL/L (ref 3.5–5.1)
PROTHROMBIN TIME: 10.9 SEC (ref 9–11.1)
RBC # BLD AUTO: 3.43 M/UL (ref 3.8–5.2)
RBC MORPH BLD: ABNORMAL
SODIUM SERPL-SCNC: 139 MMOL/L (ref 136–145)
TROPONIN I SERPL HS-MCNC: 6 NG/L (ref 0–51)
WBC # BLD AUTO: 8 K/UL (ref 3.6–11)

## 2024-04-09 PROCEDURE — 83880 ASSAY OF NATRIURETIC PEPTIDE: CPT

## 2024-04-09 PROCEDURE — 84484 ASSAY OF TROPONIN QUANT: CPT

## 2024-04-09 PROCEDURE — 1100000003 HC PRIVATE W/ TELEMETRY

## 2024-04-09 PROCEDURE — 85610 PROTHROMBIN TIME: CPT

## 2024-04-09 PROCEDURE — 80048 BASIC METABOLIC PNL TOTAL CA: CPT

## 2024-04-09 PROCEDURE — 2580000003 HC RX 258: Performed by: STUDENT IN AN ORGANIZED HEALTH CARE EDUCATION/TRAINING PROGRAM

## 2024-04-09 PROCEDURE — 36415 COLL VENOUS BLD VENIPUNCTURE: CPT

## 2024-04-09 PROCEDURE — 6360000002 HC RX W HCPCS: Performed by: STUDENT IN AN ORGANIZED HEALTH CARE EDUCATION/TRAINING PROGRAM

## 2024-04-09 PROCEDURE — 85025 COMPLETE CBC W/AUTO DIFF WBC: CPT

## 2024-04-09 PROCEDURE — 6360000002 HC RX W HCPCS

## 2024-04-09 PROCEDURE — 6370000000 HC RX 637 (ALT 250 FOR IP): Performed by: STUDENT IN AN ORGANIZED HEALTH CARE EDUCATION/TRAINING PROGRAM

## 2024-04-09 RX ORDER — ACETAMINOPHEN 650 MG/1
650 SUPPOSITORY RECTAL EVERY 6 HOURS PRN
Status: DISCONTINUED | OUTPATIENT
Start: 2024-04-09 | End: 2024-04-12 | Stop reason: HOSPADM

## 2024-04-09 RX ORDER — ENOXAPARIN SODIUM 100 MG/ML
30 INJECTION SUBCUTANEOUS 2 TIMES DAILY
Status: DISCONTINUED | OUTPATIENT
Start: 2024-04-09 | End: 2024-04-09

## 2024-04-09 RX ORDER — HYDROCHLOROTHIAZIDE 25 MG/1
12.5 TABLET ORAL DAILY
Status: DISCONTINUED | OUTPATIENT
Start: 2024-04-09 | End: 2024-04-12 | Stop reason: HOSPADM

## 2024-04-09 RX ORDER — LISINOPRIL 20 MG/1
20 TABLET ORAL DAILY
Status: DISCONTINUED | OUTPATIENT
Start: 2024-04-09 | End: 2024-04-12 | Stop reason: HOSPADM

## 2024-04-09 RX ORDER — TRAMADOL HYDROCHLORIDE 50 MG/1
50 TABLET ORAL EVERY 6 HOURS PRN
Status: DISCONTINUED | OUTPATIENT
Start: 2024-04-09 | End: 2024-04-12 | Stop reason: HOSPADM

## 2024-04-09 RX ORDER — VITAMIN B COMPLEX
1000 TABLET ORAL DAILY
Status: DISCONTINUED | OUTPATIENT
Start: 2024-04-09 | End: 2024-04-12 | Stop reason: HOSPADM

## 2024-04-09 RX ORDER — HYDRALAZINE HYDROCHLORIDE 20 MG/ML
10 INJECTION INTRAMUSCULAR; INTRAVENOUS EVERY 6 HOURS PRN
Status: DISCONTINUED | OUTPATIENT
Start: 2024-04-09 | End: 2024-04-12 | Stop reason: HOSPADM

## 2024-04-09 RX ORDER — AMLODIPINE BESYLATE 5 MG/1
10 TABLET ORAL DAILY
Status: DISCONTINUED | OUTPATIENT
Start: 2024-04-09 | End: 2024-04-12 | Stop reason: HOSPADM

## 2024-04-09 RX ORDER — ONDANSETRON 2 MG/ML
4 INJECTION INTRAMUSCULAR; INTRAVENOUS EVERY 6 HOURS PRN
Status: DISCONTINUED | OUTPATIENT
Start: 2024-04-09 | End: 2024-04-12 | Stop reason: HOSPADM

## 2024-04-09 RX ORDER — MORPHINE SULFATE 2 MG/ML
2 INJECTION, SOLUTION INTRAMUSCULAR; INTRAVENOUS EVERY 4 HOURS PRN
Status: DISCONTINUED | OUTPATIENT
Start: 2024-04-09 | End: 2024-04-12 | Stop reason: HOSPADM

## 2024-04-09 RX ORDER — SODIUM CHLORIDE 0.9 % (FLUSH) 0.9 %
5-40 SYRINGE (ML) INJECTION EVERY 12 HOURS SCHEDULED
Status: DISCONTINUED | OUTPATIENT
Start: 2024-04-09 | End: 2024-04-12 | Stop reason: HOSPADM

## 2024-04-09 RX ORDER — MAGNESIUM SULFATE IN WATER 40 MG/ML
2000 INJECTION, SOLUTION INTRAVENOUS PRN
Status: DISCONTINUED | OUTPATIENT
Start: 2024-04-09 | End: 2024-04-10

## 2024-04-09 RX ORDER — METOCLOPRAMIDE HYDROCHLORIDE 5 MG/ML
10 INJECTION INTRAMUSCULAR; INTRAVENOUS EVERY 6 HOURS PRN
Status: DISCONTINUED | OUTPATIENT
Start: 2024-04-09 | End: 2024-04-12 | Stop reason: HOSPADM

## 2024-04-09 RX ORDER — SODIUM CHLORIDE 0.9 % (FLUSH) 0.9 %
5-40 SYRINGE (ML) INJECTION PRN
Status: DISCONTINUED | OUTPATIENT
Start: 2024-04-09 | End: 2024-04-12 | Stop reason: HOSPADM

## 2024-04-09 RX ORDER — ACETAMINOPHEN 325 MG/1
650 TABLET ORAL EVERY 6 HOURS PRN
Status: DISCONTINUED | OUTPATIENT
Start: 2024-04-09 | End: 2024-04-12 | Stop reason: HOSPADM

## 2024-04-09 RX ORDER — POLYETHYLENE GLYCOL 3350 17 G/17G
17 POWDER, FOR SOLUTION ORAL DAILY PRN
Status: DISCONTINUED | OUTPATIENT
Start: 2024-04-09 | End: 2024-04-12 | Stop reason: HOSPADM

## 2024-04-09 RX ORDER — HYDRALAZINE HYDROCHLORIDE 20 MG/ML
25 INJECTION INTRAMUSCULAR; INTRAVENOUS ONCE
Status: COMPLETED | OUTPATIENT
Start: 2024-04-09 | End: 2024-04-09

## 2024-04-09 RX ORDER — POTASSIUM CHLORIDE 20 MEQ/1
40 TABLET, EXTENDED RELEASE ORAL PRN
Status: DISCONTINUED | OUTPATIENT
Start: 2024-04-09 | End: 2024-04-10

## 2024-04-09 RX ORDER — FERROUS SULFATE 325(65) MG
325 TABLET ORAL
Status: DISCONTINUED | OUTPATIENT
Start: 2024-04-09 | End: 2024-04-12 | Stop reason: HOSPADM

## 2024-04-09 RX ORDER — ONDANSETRON 4 MG/1
4 TABLET, ORALLY DISINTEGRATING ORAL EVERY 8 HOURS PRN
Status: DISCONTINUED | OUTPATIENT
Start: 2024-04-09 | End: 2024-04-12 | Stop reason: HOSPADM

## 2024-04-09 RX ORDER — POTASSIUM CHLORIDE 7.45 MG/ML
10 INJECTION INTRAVENOUS PRN
Status: DISCONTINUED | OUTPATIENT
Start: 2024-04-09 | End: 2024-04-10

## 2024-04-09 RX ORDER — HYDRALAZINE HYDROCHLORIDE 20 MG/ML
INJECTION INTRAMUSCULAR; INTRAVENOUS
Status: COMPLETED
Start: 2024-04-09 | End: 2024-04-09

## 2024-04-09 RX ORDER — SODIUM CHLORIDE 9 MG/ML
INJECTION, SOLUTION INTRAVENOUS PRN
Status: DISCONTINUED | OUTPATIENT
Start: 2024-04-09 | End: 2024-04-12 | Stop reason: HOSPADM

## 2024-04-09 RX ADMIN — TRAMADOL HYDROCHLORIDE 50 MG: 50 TABLET ORAL at 03:58

## 2024-04-09 RX ADMIN — TRAMADOL HYDROCHLORIDE 50 MG: 50 TABLET ORAL at 13:01

## 2024-04-09 RX ADMIN — HYDRALAZINE HYDROCHLORIDE 10 MG: 20 INJECTION INTRAMUSCULAR; INTRAVENOUS at 10:15

## 2024-04-09 RX ADMIN — LISINOPRIL 20 MG: 20 TABLET ORAL at 07:32

## 2024-04-09 RX ADMIN — HYDRALAZINE HYDROCHLORIDE 10 MG: 20 INJECTION INTRAMUSCULAR; INTRAVENOUS at 03:58

## 2024-04-09 RX ADMIN — METOCLOPRAMIDE 10 MG: 5 INJECTION, SOLUTION INTRAMUSCULAR; INTRAVENOUS at 10:16

## 2024-04-09 RX ADMIN — AMLODIPINE BESYLATE 10 MG: 5 TABLET ORAL at 07:31

## 2024-04-09 RX ADMIN — FERROUS SULFATE TAB 325 MG (65 MG ELEMENTAL FE) 325 MG: 325 (65 FE) TAB at 10:52

## 2024-04-09 RX ADMIN — HYDROCHLOROTHIAZIDE 12.5 MG: 25 TABLET ORAL at 07:32

## 2024-04-09 RX ADMIN — ONDANSETRON 4 MG: 2 INJECTION INTRAMUSCULAR; INTRAVENOUS at 02:36

## 2024-04-09 RX ADMIN — ONDANSETRON 4 MG: 2 INJECTION INTRAMUSCULAR; INTRAVENOUS at 07:40

## 2024-04-09 RX ADMIN — SODIUM CHLORIDE, PRESERVATIVE FREE 10 ML: 5 INJECTION INTRAVENOUS at 21:38

## 2024-04-09 RX ADMIN — HYDRALAZINE HYDROCHLORIDE 25 MG: 20 INJECTION INTRAMUSCULAR; INTRAVENOUS at 05:14

## 2024-04-09 RX ADMIN — MORPHINE SULFATE 2 MG: 2 INJECTION, SOLUTION INTRAMUSCULAR; INTRAVENOUS at 05:14

## 2024-04-09 RX ADMIN — MORPHINE SULFATE 2 MG: 2 INJECTION, SOLUTION INTRAMUSCULAR; INTRAVENOUS at 10:15

## 2024-04-09 RX ADMIN — ACETAMINOPHEN 650 MG: 325 TABLET ORAL at 07:31

## 2024-04-09 RX ADMIN — SODIUM CHLORIDE, PRESERVATIVE FREE 10 ML: 5 INJECTION INTRAVENOUS at 10:57

## 2024-04-09 RX ADMIN — MORPHINE SULFATE 2 MG: 2 INJECTION, SOLUTION INTRAMUSCULAR; INTRAVENOUS at 01:15

## 2024-04-09 RX ADMIN — TRAMADOL HYDROCHLORIDE 50 MG: 50 TABLET ORAL at 21:37

## 2024-04-09 RX ADMIN — Medication 1000 UNITS: at 10:52

## 2024-04-09 ASSESSMENT — PAIN DESCRIPTION - LOCATION
LOCATION: FLANK

## 2024-04-09 ASSESSMENT — PAIN SCALES - GENERAL
PAINLEVEL_OUTOF10: 7
PAINLEVEL_OUTOF10: 0
PAINLEVEL_OUTOF10: 3
PAINLEVEL_OUTOF10: 7
PAINLEVEL_OUTOF10: 4
PAINLEVEL_OUTOF10: 7

## 2024-04-09 ASSESSMENT — PAIN DESCRIPTION - DESCRIPTORS
DESCRIPTORS: STABBING
DESCRIPTORS: ACHING
DESCRIPTORS: SHARP;STABBING
DESCRIPTORS: STABBING

## 2024-04-09 ASSESSMENT — PAIN DESCRIPTION - FREQUENCY: FREQUENCY: INTERMITTENT

## 2024-04-09 ASSESSMENT — PAIN DESCRIPTION - PAIN TYPE: TYPE: ACUTE PAIN

## 2024-04-09 ASSESSMENT — PAIN DESCRIPTION - ORIENTATION
ORIENTATION: RIGHT

## 2024-04-09 NOTE — H&P
Hospitalist Admission Note    NAME:   Елена Sr   : 1945   MRN: [id number]     Date/Time: 2024 1:08 AM    Patient PCP: Tere Campo APRN - [facility]    ______________________________________________________________________  Given the patient's current clinical presentation, I have a high level of concern for decompensation if discharged from the emergency department.  Complex decision making was performed, which includes reviewing the patient's available past medical records, laboratory results, and x-ray films.       My assessment of this patient's clinical condition and my plan of care is as follows.    Assessment / Plan:  Retroperitoneal hematoma in the setting of Eliquis use  History of pulmonary embolism requiring Eliquis use  CT abdomen pelvis-Right retroperitoneal hematoma displacing the kidney anteriorly and extending  into the region of the right adrenal gland. Consider adrenal hemorrhage, however correlation with any history of trauma is recommended.  Hemoglobin on arrival 10.5  Patient continuously having pain at this moment will order morphine for pain management  Will monitor hemoglobin and serial labs  Hold off Eliquis for now  Will get coags panel  Will avoid any reversal of anticoagulation at this moment.  If persistent pain or increased hematoma with decreased hemoglobin-May need to consult IR.  Less likely patient has adrenal hemorrhage at this moment.  Blood pressure is stable, no active intervention at this moment    Elevated creatinine  Appears to be baseline  Monitoring BMP    Bilateral lower extremity swelling  Will check BNP  Likely due to adverse effects of amlodipine  Limb elevation for now    History of iron deficiency anemia  History of vitamin D deficiency  Hypertension  Continue home medications      Medical Decision Making:   I personally reviewed labs: CBC BMP  I personally reviewed imaging: CT scan of abdomen pelvis-Right retroperitoneal hematoma displacing the

## 2024-04-09 NOTE — PROGRESS NOTES
Nonbillable progress note.  Hemoglobin stable from yesterday to today.  Discussed continuing to hold Eliquis, if hemoglobin remains stable and pain remains stable could consider resuming while inpatient and trending hemoglobin under observation.  Patient agreeable to this plan.  No additional complaints, slightly improving flank pain.  Some brief episodes of nausea after pain medication administration.  Otherwise agree with overnight H&P.

## 2024-04-09 NOTE — ED NOTES
Report given to LEANN Strong by Tabby Simental RN. Nurse was informed of reason for arrival, vitals, labs, medications, orders, procedures, results, anything left pending and current plan of action. Questions were asked and received prior to departure from the patient.

## 2024-04-09 NOTE — ED TRIAGE NOTES
Pt states she was eating dinner when she started having 10/10 pain that stated in her back and moved to her abdomen and is now staying in her upper right quadrant of her abdomen. Pt states she hasn't taken any pain medications before coming.

## 2024-04-09 NOTE — PROGRESS NOTES
0710: Bedside and Verbal shift change report given to Ligia (oncoming nurse) by Tabby (offgoing nurse). Report included the following information Nurse Handoff Report.     0721: Patient resting in bed. A&O x 4. Informed patient to call for assistance before getting out of bed. Families at bedside.    0740: PRN zofran given.    1006: Patient still having nausea despite of receiving PRN zofran at 0740. Secure message sent to Dr. Callahan and received order for reglan.    1015: PRN reglan, hydralazine, and morphine given.    1045: Pain reassessed, patient states it is 4 out of 10 now and is getting better.    1220: Per the patient, it is okay to give updates to the daughter, Roberto Sr. Phone number is [phone].    1410: TRANSFER - OUT REPORT:    Verbal report given to Anetaliz Sr  being transferred to [facility] for routine progression of patient care       Report consisted of patient's Situation, Background, Assessment and   Recommendations(SBAR).     Information from the following report(s) Nurse Handoff Report was reviewed with the receiving nurse.    Soraya Fall Assessment:    Presents to emergency department  because of falls (Syncope, seizure, or loss of consciousness): No  Age > 70: No  Altered Mental Status, Intoxication with alcohol or substance confusion (Disorientation, impaired judgment, poor safety awaremess, or inability to follow instructions): No  Impaired Mobility: Ambulates or transfers with assistive devices or assistance; Unable to ambulate or transer.: No  Nursing Judgement: No          Lines:   Peripheral IV 04/08/24 Left Antecubital (Active)   Site Assessment Clean, dry & intact 04/09/24 0721   Line Status Flushed;Normal saline locked;Capped 04/09/24 0721   Line Care Cap changed 04/09/24 0721   Phlebitis Assessment No symptoms 04/09/24 0721   Infiltration Assessment 0 04/09/24 0721   Alcohol Cap Used Yes 04/09/24 0721   Dressing Status Clean, dry & intact 04/09/24 0721   Dressing Type

## 2024-04-09 NOTE — ED PROVIDER NOTES
[facility] EMERGENCY DEPT  EMERGENCY DEPARTMENT ENCOUNTER       Pt Name: Елена Sr  MRN: [id number]  Birthdate 1945  Date of evaluation: 4/8/2024  Provider: Otilio Navarro MD   PCP: Tere Campo APRN - NP  Note Started: 12:25 AM 4/8/24     CHIEF COMPLAINT       Chief Complaint   Patient presents with    Abdominal Pain     Via EMS for 10/10 right sided abd pain that started in the back and now is in the front of her abdomen. Per EMS, patient was at the dinner table eating dinner when the pain started. Per Patient, last time she had this type of pain she came to ER and it was a blood clot. She does take eloquis and took her most recent dose this evening.      HISTORY OF PRESENT ILLNESS: 1 or more elements      History From: Patient, History limited by: None     Елена Sr is a 79 y.o. female who presents with abdominal pain.  She reports onset of right-sided low back and right abdominal pain after eating pigs feet and string beans.  She denies a history of chronic abdominal pain.  Denies any associated nausea vomiting constipation.  Denies urinary symptoms particularly dysuria or urinary frequency.  Denies diarrhea.  Reports ongoing pain.  She has a history of recently diagnosed PE, Reports compliance with her blood thinner.     Nursing Notes were all reviewed and agreed with or any disagreements were addressed in the HPI.     REVIEW OF SYSTEMS      Positives and Pertinent negatives as per HPI.    PAST HISTORY     Past Medical History:  Past Medical History:   Diagnosis Date    Hypertension        Past Surgical History:  History reviewed. No pertinent surgical history.    Family History:  Family History   Problem Relation Age of Onset    Breast Cancer Sister         under 50       Social History:  Social History     Tobacco Use    Smoking status: Never    Smokeless tobacco: Never   Substance Use Topics    Alcohol use: Not Currently    Drug use: No       Allergies:  No Known Allergies    CURRENT MEDICATIONS

## 2024-04-09 NOTE — ED NOTES
Notified hospitalist that BP still elevated. Verbal order given for 25 mg IV hydralazine. Order read back to physician and physician confirmed.

## 2024-04-10 ENCOUNTER — APPOINTMENT (OUTPATIENT)
Facility: HOSPITAL | Age: 79
End: 2024-04-10
Payer: MEDICARE

## 2024-04-10 LAB
ANION GAP SERPL CALC-SCNC: 6 MMOL/L (ref 5–15)
BASOPHILS # BLD: 0 K/UL (ref 0–0.1)
BASOPHILS NFR BLD: 0 % (ref 0–1)
BUN SERPL-MCNC: 23 MG/DL (ref 6–20)
BUN/CREAT SERPL: 22 (ref 12–20)
CALCIUM SERPL-MCNC: 9.4 MG/DL (ref 8.5–10.1)
CHLORIDE SERPL-SCNC: 106 MMOL/L (ref 97–108)
CO2 SERPL-SCNC: 28 MMOL/L (ref 21–32)
CREAT SERPL-MCNC: 1.04 MG/DL (ref 0.55–1.02)
DIFFERENTIAL METHOD BLD: ABNORMAL
EOSINOPHIL # BLD: 0.1 K/UL (ref 0–0.4)
EOSINOPHIL NFR BLD: 1 % (ref 0–7)
ERYTHROCYTE [DISTWIDTH] IN BLOOD BY AUTOMATED COUNT: 15.4 % (ref 11.5–14.5)
GLUCOSE SERPL-MCNC: 106 MG/DL (ref 65–100)
HCT VFR BLD AUTO: 30.6 % (ref 35–47)
HGB BLD-MCNC: 9.4 G/DL (ref 11.5–16)
IMM GRANULOCYTES # BLD AUTO: 0 K/UL (ref 0–0.04)
IMM GRANULOCYTES NFR BLD AUTO: 1 % (ref 0–0.5)
LYMPHOCYTES # BLD: 0.8 K/UL (ref 0.8–3.5)
LYMPHOCYTES NFR BLD: 11 % (ref 12–49)
MCH RBC QN AUTO: 28.7 PG (ref 26–34)
MCHC RBC AUTO-ENTMCNC: 30.7 G/DL (ref 30–36.5)
MCV RBC AUTO: 93.6 FL (ref 80–99)
MONOCYTES # BLD: 0.6 K/UL (ref 0–1)
MONOCYTES NFR BLD: 8 % (ref 5–13)
NEUTS SEG # BLD: 5.5 K/UL (ref 1.8–8)
NEUTS SEG NFR BLD: 79 % (ref 32–75)
NRBC # BLD: 0 K/UL (ref 0–0.01)
NRBC BLD-RTO: 0 PER 100 WBC
PLATELET # BLD AUTO: 189 K/UL (ref 150–400)
PMV BLD AUTO: 11 FL (ref 8.9–12.9)
POTASSIUM SERPL-SCNC: 4.2 MMOL/L (ref 3.5–5.1)
RBC # BLD AUTO: 3.27 M/UL (ref 3.8–5.2)
SODIUM SERPL-SCNC: 140 MMOL/L (ref 136–145)
WBC # BLD AUTO: 6.9 K/UL (ref 3.6–11)

## 2024-04-10 PROCEDURE — 85025 COMPLETE CBC W/AUTO DIFF WBC: CPT

## 2024-04-10 PROCEDURE — 6370000000 HC RX 637 (ALT 250 FOR IP): Performed by: STUDENT IN AN ORGANIZED HEALTH CARE EDUCATION/TRAINING PROGRAM

## 2024-04-10 PROCEDURE — 97116 GAIT TRAINING THERAPY: CPT

## 2024-04-10 PROCEDURE — 2580000003 HC RX 258: Performed by: STUDENT IN AN ORGANIZED HEALTH CARE EDUCATION/TRAINING PROGRAM

## 2024-04-10 PROCEDURE — 36415 COLL VENOUS BLD VENIPUNCTURE: CPT

## 2024-04-10 PROCEDURE — 6360000002 HC RX W HCPCS: Performed by: STUDENT IN AN ORGANIZED HEALTH CARE EDUCATION/TRAINING PROGRAM

## 2024-04-10 PROCEDURE — 71275 CT ANGIOGRAPHY CHEST: CPT

## 2024-04-10 PROCEDURE — 97161 PT EVAL LOW COMPLEX 20 MIN: CPT

## 2024-04-10 PROCEDURE — 97530 THERAPEUTIC ACTIVITIES: CPT

## 2024-04-10 PROCEDURE — 97166 OT EVAL MOD COMPLEX 45 MIN: CPT | Performed by: OCCUPATIONAL THERAPIST

## 2024-04-10 PROCEDURE — 80048 BASIC METABOLIC PNL TOTAL CA: CPT

## 2024-04-10 PROCEDURE — 6370000000 HC RX 637 (ALT 250 FOR IP): Performed by: INTERNAL MEDICINE

## 2024-04-10 PROCEDURE — 97535 SELF CARE MNGMENT TRAINING: CPT | Performed by: OCCUPATIONAL THERAPIST

## 2024-04-10 PROCEDURE — 99222 1ST HOSP IP/OBS MODERATE 55: CPT | Performed by: STUDENT IN AN ORGANIZED HEALTH CARE EDUCATION/TRAINING PROGRAM

## 2024-04-10 PROCEDURE — 1100000003 HC PRIVATE W/ TELEMETRY

## 2024-04-10 PROCEDURE — 6360000004 HC RX CONTRAST MEDICATION: Performed by: INTERNAL MEDICINE

## 2024-04-10 RX ORDER — HYDRALAZINE HYDROCHLORIDE 25 MG/1
25 TABLET, FILM COATED ORAL EVERY 8 HOURS SCHEDULED
Status: DISCONTINUED | OUTPATIENT
Start: 2024-04-10 | End: 2024-04-11

## 2024-04-10 RX ADMIN — Medication 1000 UNITS: at 08:31

## 2024-04-10 RX ADMIN — ONDANSETRON 4 MG: 2 INJECTION INTRAMUSCULAR; INTRAVENOUS at 11:16

## 2024-04-10 RX ADMIN — AMLODIPINE BESYLATE 10 MG: 5 TABLET ORAL at 08:31

## 2024-04-10 RX ADMIN — HYDRALAZINE HYDROCHLORIDE 10 MG: 20 INJECTION INTRAMUSCULAR; INTRAVENOUS at 14:36

## 2024-04-10 RX ADMIN — FERROUS SULFATE TAB 325 MG (65 MG ELEMENTAL FE) 325 MG: 325 (65 FE) TAB at 06:48

## 2024-04-10 RX ADMIN — HYDROCHLOROTHIAZIDE 12.5 MG: 25 TABLET ORAL at 08:31

## 2024-04-10 RX ADMIN — SODIUM CHLORIDE, PRESERVATIVE FREE 10 ML: 5 INJECTION INTRAVENOUS at 21:17

## 2024-04-10 RX ADMIN — IOPAMIDOL 100 ML: 755 INJECTION, SOLUTION INTRAVENOUS at 10:57

## 2024-04-10 RX ADMIN — TRAMADOL HYDROCHLORIDE 50 MG: 50 TABLET ORAL at 13:52

## 2024-04-10 RX ADMIN — HYDRALAZINE HYDROCHLORIDE 25 MG: 25 TABLET ORAL at 13:50

## 2024-04-10 RX ADMIN — LISINOPRIL 20 MG: 20 TABLET ORAL at 08:31

## 2024-04-10 RX ADMIN — HYDRALAZINE HYDROCHLORIDE 10 MG: 20 INJECTION INTRAMUSCULAR; INTRAVENOUS at 08:31

## 2024-04-10 RX ADMIN — POLYETHYLENE GLYCOL 3350 17 G: 17 POWDER, FOR SOLUTION ORAL at 14:30

## 2024-04-10 RX ADMIN — MORPHINE SULFATE 2 MG: 2 INJECTION, SOLUTION INTRAMUSCULAR; INTRAVENOUS at 11:16

## 2024-04-10 RX ADMIN — HYDRALAZINE HYDROCHLORIDE 25 MG: 25 TABLET ORAL at 21:17

## 2024-04-10 RX ADMIN — SODIUM CHLORIDE, PRESERVATIVE FREE 10 ML: 5 INJECTION INTRAVENOUS at 08:34

## 2024-04-10 RX ADMIN — TRAMADOL HYDROCHLORIDE 50 MG: 50 TABLET ORAL at 08:32

## 2024-04-10 ASSESSMENT — PAIN DESCRIPTION - DESCRIPTORS
DESCRIPTORS: ACHING
DESCRIPTORS: ACHING

## 2024-04-10 ASSESSMENT — PAIN DESCRIPTION - LOCATION
LOCATION: ABDOMEN
LOCATION: FLANK
LOCATION: ABDOMEN;FLANK
LOCATION: ABDOMEN

## 2024-04-10 ASSESSMENT — PAIN SCALES - GENERAL
PAINLEVEL_OUTOF10: 6
PAINLEVEL_OUTOF10: 2
PAINLEVEL_OUTOF10: 7
PAINLEVEL_OUTOF10: 3
PAINLEVEL_OUTOF10: 7
PAINLEVEL_OUTOF10: 5

## 2024-04-10 ASSESSMENT — PAIN DESCRIPTION - ORIENTATION
ORIENTATION: RIGHT
ORIENTATION: RIGHT;LOWER;MID

## 2024-04-10 NOTE — CARE COORDINATION
Care Management Initial Assessment       RUR: 14% \"low risk\"  Readmission? No  1st IM letter given? Yes, given by Patient Access Team on 4/9 1st [facility] letter given: N/A    Initial note - 1612 PM: Chart reviewed. CM met with pt at the bedside to introduce self and role. Verified contact information and demographics. Pt resides with her daughter in a one level home with 3 PAUL. Pt PCP is [facility] Campo with the last visit being within the last three months. Preferred pharmacy is [facility]. Pt has no hx of HH services and previous CM was unsuccessful with finding a HH agency that could accept the pt insurance. Pt has no hx of a SNF stay. Pt is independent with ADL's and has no DME needs as she has a cane and walker at home. Pt is an active . Pt has no ACP on file; pt is a FULL code. Full assessment below:      04/10/24 1612   Service Assessment   Patient Orientation Alert and Oriented;Person;Place;Situation;Self   Cognition Alert   History Provided By Patient   Primary Caregiver Self   Support Systems Family Members   Patient's Healthcare Decision Maker is: Legal Next of Kin   PCP Verified by CM Yes  ([facility] Campo)   Last Visit to PCP Within last 3 months   Prior Functional Level Independent in ADLs/IADLs   Current Functional Level Independent in ADLs/IADLs   Can patient return to prior living arrangement Yes   Ability to make needs known: Good   Family able to assist with home care needs: Yes   Would you like for me to discuss the discharge plan with any other family members/significant others, and if so, who? Yes  (Bhargavi Coulter, [phone])   Financial Resources Medicare  ([employer] Medicare)   Community Resources None   Social/Functional History   Lives With Daughter   Type of Home House   Home Layout One level   Home Access Stairs to enter with rails   Entrance Stairs - Number of Steps 3 PAUL   Home Equipment Cane;Walker, rolling   Receives Help From Family   ADL Assistance

## 2024-04-10 NOTE — PLAN OF CARE
Problem: Occupational Therapy - Adult  Goal: By Discharge: Performs self-care activities at highest level of function for planned discharge setting.  See evaluation for individualized goals.  Description: FUNCTIONAL STATUS PRIOR TO ADMISSION:    Receives Help From: Family, ADL Assistance: Independent,  ,  ,  ,  ,  , Homemaking Assistance: Independent, Ambulation Assistance: Independent, Transfer Assistance: Independent, Active : Yes   Pt reports independence in adls and performs some IADLs--laundry, driving to store; daughter does cooking  HOME SUPPORT: Patient lived with daughter but didn't require assistance.    Occupational Therapy Goals:  Initiated 4/10/2024  1.  Patient will perform grooming in standing with Supervision within 7 day(s).  2.  Patient will perform sponge  bathing with Minimal Assist within 7 day(s).  3.  Patient will perform upper body dressing and lower body dressing with Supervision within 7 day(s).  4.  Patient will perform toilet transfers with Supervision  within 7 day(s).  5.  Patient will perform all aspects of toileting with Supervision within 7 day(s).  6.  Patient will participate in upper extremity therapeutic exercise/activities with Supervision for 5 minutes within 7 day(s).    Outcome: Not Progressing     OCCUPATIONAL THERAPY EVALUATION    Patient: Елена Sr (79 y.o. female)  Date: 4/10/2024  Primary Diagnosis: Adrenal hemorrhage (HCC) [E27.49]  Retroperitoneal hematoma [K68.3]         Precautions:                fall risk    ASSESSMENT :  The patient is limited by decreased functional mobility, independence in ADLs, strength, sensation, activity tolerance, endurance, balance, increased pain levels, and BP elevated upon arrival.  Pt's daughter and niece were present and providing PLOF information as well as pt.  Pt is independent at baseline and had HH therapy services after her last admission .  Pt was admitted due to abdominal pain.  (Pt had no complaints of pain this  The need for supervision renders the patient not independent.  4. A patient's performance should be established using the best available evidence. Asking the patient, friends/relatives and nurses are the usual sources, but direct observation and common sense are also important. However direct testing is not needed.  5. Usually the patient's performance over the preceding 24-48 hours is important, but occasionally longer periods will be relevant.  6. Middle categories imply that the patient supplies over 50 per cent of the effort.  7. Use of aids to be independent is allowed.    Score Interpretation (from [place] )    Independent   60-79 Minimally independent   40-59 Partially dependent   20-39 Very dependent   <20 Totally dependent     -Trey Batista., BarthelJAMIE. (1965). Functional evaluation: the Barthel Index. Md State Med J 142.  -SHARIFA Wesley, MARY Jade (). The Barthel activities of daily living index: self-reporting versus actual performance in the old (> or = 75 years). Journal of American Geriatric Society 45(7), 832-836.   -CHANTAL Ferraro, THALIA Gonzales., Renato, KATHY., Rodríguez, A.J. (1999). Measuring the change in disability after inpatient rehabilitation; comparison of the responsiveness of the Barthel Index and Functional [place] Measure. Journal of Neurology, Neurosurgery, and Psychiatry, 66(4), 480-484.  -ALONSO Mario, CARLITOS Cooper, & Mansi, M.A. (2004) Assessment of post-stroke quality of life in cost-effectiveness studies: The usefulness of the Barthel Index and the EuroQoL-5D. Quality of Life Research, 13, 427-43                                                                                                                                                                                                                                 Pain Ratin/10   Pain Intervention(s):       Activity Tolerance:   Fair  and Poor  HR elevated during mobility to

## 2024-04-10 NOTE — PROGRESS NOTES
Hospitalist Progress Note    NAME:   Елена Sr   : 1945   MRN: [id number]     Date/Time: 4/10/2024 4:44 PM  Patient PCP: Tere Campo APRN - NP    Estimated discharge date: 24  Barriers: HB stability      Assessment / Plan:    Retroperitoneal hematoma in the setting of Eliquis use  History of pulmonary embolism requiring Eliquis use  CT abdomen pelvis-Right retroperitoneal hematoma displacing the kidney anteriorly and extending  into the region of the right adrenal gland. Consider adrenal hemorrhage, however correlation with any history of trauma is recommended.  Hemoglobin on arrival 10.5  Will monitor hemoglobin and serial labs  Hold off Eliquis   Appreciate hematology consult    Cavitary lesion in left lower lobe  Will consult pulmonology     Elevated creatinine  Appears to be baseline  Monitoring BMP     Bilateral lower extremity swelling  Likely due to adverse effects of amlodipine     History of iron deficiency anemia  History of vitamin D deficiency  Hypertension  Continue home medications  Hydralazine po added today due to persistent hypertension             Medical Decision Making:   I personally reviewed labs:cbc, BMP  I personally reviewed imaging:CTA chest, CT abdomen  I personally reviewed EKG:  Toxic drug monitoring:   Discussed case with: patient, family, RN        Code Status: full  DVT Prophylaxis:none- eliquis held due to retroperitoneal bleed  GI Prophylaxis:    Subjective:     Chief Complaint / Reason for Physician Visit  Hb 9.4  Updated family member about CT chest findings and further plan. Possible discharge tomorrow if Hb stable.      Objective:     VITALS:   Last 24hrs VS reviewed since prior progress note. Most recent are:  Patient Vitals for the past 24 hrs:   BP Temp Temp src Pulse Resp SpO2   04/10/24 1524 (!) 150/58 -- -- (!) 110 -- --   04/10/24 1511 139/62 98.5 °F (36.9 °C) Oral (!) 107 18 91 %   04/10/24 1422 -- -- -- -- 16 --   04/10/24 1240 (!) 197/83 --

## 2024-04-10 NOTE — PLAN OF CARE
Problem: Safety - Adult  Goal: Free from fall injury  4/9/2024 2149 by Debby Marroquin RN  Outcome: Progressing  4/9/2024 1731 by Aneta Trevino RN  Outcome: Progressing     Problem: Pain  Goal: Verbalizes/displays adequate comfort level or baseline comfort level  4/9/2024 2149 by Debby Marroquin RN  Outcome: Progressing  4/9/2024 1731 by Aneta Trevino RN  Outcome: Progressing     Problem: Discharge Planning  Goal: Discharge to home or other facility with appropriate resources  4/9/2024 2149 by Debby Marroquin RN  Outcome: Progressing  4/9/2024 1731 by Aneta Trevino RN  Outcome: Progressing

## 2024-04-10 NOTE — PLAN OF CARE
Problem: Physical Therapy - Adult  Goal: By Discharge: Performs mobility at highest level of function for planned discharge setting.  See evaluation for individualized goals.  Description: FUNCTIONAL STATUS PRIOR TO ADMISSION: Patient was modified independent using a single point cane for functional mobility.    HOME SUPPORT PRIOR TO ADMISSION: The patient lived with family but did not require assistance.    Physical Therapy Goals  Initiated 4/10/2024  1.  Patient will move from supine to sit and sit to supine in bed with supervision/set-up within 7 day(s).    2.  Patient will perform sit to stand with supervision/set-up within 7 day(s).  3.  Patient will transfer from bed to chair and chair to bed with supervision/set-up using the least restrictive device within 7 day(s).  4.  Patient will ambulate with supervision/set-up for 100 feet with the least restrictive device within 7 day(s).   5.  Patient will ascend/descend 3 stairs with  handrail(s) with contact guard assist within 7 day(s).   Outcome: Not Progressing     PHYSICAL THERAPY EVALUATION    Patient: Елена Sr (79 y.o. female)  Date: 4/10/2024  Primary Diagnosis: Adrenal hemorrhage (HCC) [E27.49]  Retroperitoneal hematoma [K68.3]       Precautions:                        ASSESSMENT :   DEFICITS/IMPAIRMENTS:   The patient is limited by decreased functional mobility, strength, activity tolerance, balance.  Patient received in bed and agreeable to participate, family also present in room.  Patient was able to come to sit EOB with min assist and extra time, then can sit unsupported and maintain balance once squared up.  Patient stood to RW and able to reach to pull up brief but needs support for safety and does demonstrate increased sway.  Ambulated a short distance to chair, gait slow with wide RADHA and increased lateral sway. HR elevated to 121 with activity and patient appeared fatigued, sats briefly at 89% on RA but improved to 92% with deep nasal                                                                                                                                                                [facility] AM-PAC®      Basic Mobility Inpatient Short Form (6-Clicks) Version 2  How much HELP from another person do you currently need... (If the patient hasn't done an activity recently, how much help from another person do you think they would need if they tried?) Total A Lot A Little None   1.  Turning from your back to your side while in a flat bed without using bedrails? []  1 []  2 [x]  3  []  4   2.  Moving from lying on your back to sitting on the side of a flat bed without using bedrails? []  1 []  2 [x]  3  []  4   3.  Moving to and from a bed to a chair (including a wheelchair)? []  1 []  2 [x]  3  []  4   4. Standing up from a chair using your arms (e.g. wheelchair or bedside chair)? []  1 []  2 [x]  3  []  4   5.  Walking in hospital room? []  1 []  2 [x]  3  []  4   6.  Climbing 3-5 steps with a railing? []  1 []  2 [x]  3  []  4     Raw Score: 18/24                            Cutoff score ?171,2,3 had higher odds of discharging home with home health or need of SNF/IPR.    1. Jazmin Arce, Jenny Franco, Mario Vitale, Vee Brito, William Colorado, Saw Arce.  Validity of the AM-PAC “6-Clicks” Inpatient Daily Activity and Basic Mobility Short Forms. Physical Therapy Mar 2014, 94 (3) 379-391; DOI: 10.2522/ptj.21821144  2. Kalyan VIGIL, Markus CATALAN, Bear CATALAN, Ken CATALAN. Association of AM-PAC \"6-Clicks\" Basic Mobility and Daily Activity Scores With Discharge Destination. Phys Ther. 2021 Apr 4;101(4):[id number]. doi: 10.1093/ptj/rznj755. PMID: 62864209.  3. Nori CATALAN, Marivel MAYERS, Krystle S, Miko K, Jude S. Activity Measure for Post-Acute Care \"6-Clicks\" Basic Mobility Scores Predict Discharge Destination After Acute Care Hospitalization in Select Patient Groups: A Retrospective, Observational Study. [facility]

## 2024-04-10 NOTE — PROGRESS NOTES
Occupational Therapy  Orders received and medical record reviewed.  Pt participated in OT Evaluation and will benefit from acute OT services.  At discharge, recommend returning home with her daughter and receive HH Therapy services.  Full OT note to follow.

## 2024-04-10 NOTE — CONSULTS
Addendum:     I have personally reviewed the nurse practitioner's documentation and have evaluated the patient independently.  I agree with her plan.  Additionally, I have included the following further information about my medical decision making and plan below.     MDM Elements:   1) Data Complexity -  I reviewed the patients most recent hematology labs, as well as relevant labs from prior encounters.   2) Complexity of problems addressed - High -   3) Risk of complications, morbidity, and mortality of treatment of management - High    Елена Sr was seen today at bedside.  Please see above documentation for subjective findings.  On my evaluation I noted a very pleasant patient without distress.  Oncology/Hematology was consulted for retroperitoneal bleed while on eliquis.  Reason for anticoagulation was small Pulmonary embolism 4 months ago.  Given risks of bleeding, would recommend holding anticoagulation from here forward.  We discussed that this increases the risk of recurrent dvt/pe, however given recent bleeding, the risks outweigh the benefit. She verbalized understanding and agreement.     Follow up with her PCP as OP            Lelo Ellis MD    Attending Medical Oncologist   [facility]                      [facility] [address] [place] [address]  W: [phone] F: [phone]      Hematology/ Oncology Consult Note      Reason for consult:     Елена Sr is a 79 y.o. female who we have been asked to see by Dr. Mathias for retroperitoneal bleed on eliquis.    Subjective:     Елена Sr is a 79 year old female admitted for retroperitoneal bleed on anticoagulation for history of small PE diagnosed in December 2023. PMH includes hypertension, obesity.     Patient seen at bedside with several family members present. They reported patient is very sedentary and does not move much during the  MD Anam        ondansetron (ZOFRAN-ODT) disintegrating tablet 4 mg  4 mg Oral Q8H PRN Anam Singh MD        Or    ondansetron (ZOFRAN) injection 4 mg  4 mg IntraVENous Q6H PRN Anam Singh MD   4 mg at 04/10/24 1116    polyethylene glycol (GLYCOLAX) packet 17 g  17 g Oral Daily PRN Anam Singh MD        acetaminophen (TYLENOL) tablet 650 mg  650 mg Oral Q6H PRN Anam Singh MD   650 mg at 04/09/24 0731    Or    acetaminophen (TYLENOL) suppository 650 mg  650 mg Rectal Q6H PRN Anam Singh MD        morphine (PF) injection 2 mg  2 mg IntraVENous Q4H PRN Anam Singh MD   2 mg at 04/10/24 1116    hydrALAZINE (APRESOLINE) injection 10 mg  10 mg IntraVENous Q6H PRN Anam Singh MD   10 mg at 04/10/24 0831    metoclopramide (REGLAN) injection 10 mg  10 mg IntraVENous Q6H PRN Bri Callahan MD   10 mg at 04/09/24 1016        No Known Allergies       Objective:     Patient Vitals for the past 8 hrs:   BP Temp Temp src Pulse Resp SpO2   04/10/24 1240 (!) 197/83 -- -- (!) 108 -- --   04/10/24 1113 (!) 171/76 98 °F (36.7 °C) Oral (!) 102 20 91 %   04/10/24 1050 (!) 177/71 -- -- (!) 104 -- 92 %   04/10/24 1045 (!) 180/75 -- -- (!) 104 -- --   04/10/24 0902 -- -- -- -- (!) 181 --   04/10/24 0730 (!) 162/81 99.1 °F (37.3 °C) Oral (!) 105 20 97 %       Lab Results   Component Value Date    WBC 6.9 04/10/2024    HGB 9.4 (L) 04/10/2024    HCT 30.6 (L) 04/10/2024    MCV 93.6 04/10/2024     04/10/2024       Physical Exam:   BP (!) 197/83   Pulse (!) 108   Temp 98 °F (36.7 °C) (Oral)   Resp 20   Ht 1.626 m (5' 4\")   Wt 122.5 kg (270 lb)   SpO2 91%   BMI 46.35 kg/m²   General appearance: alert, appears stated age, cooperative, and morbidly obese  Abdomen: soft, non-tender; bowel sounds normal; no masses,  no organomegaly  Skin: Skin color, texture, turgor normal. No rashes or lesions  Neurologic: Grossly normal    Assessment:     Spontaneous Retroperitoneal Bleed on

## 2024-04-10 NOTE — PROGRESS NOTES
1435 -Patient's BP sustained at systolic - 170s, 180s, prn hydralazine administered earlier around 8am, will repeat PRN meds, MD Mey notified, oral antihypertensives adjusted, will continue present plan of care. PRN miralax also administered for contispation, awaiting effectiveness. Last BM 4/08/24    1620- SPO2 - 88-90% on RA, patient sleeping in chair, place on 1L NC of O2, spo2- 94-95%, pulmonology consult already called jose llier for cavitary leision on CT.

## 2024-04-11 LAB
ANION GAP SERPL CALC-SCNC: 1 MMOL/L (ref 5–15)
ANION GAP SERPL CALC-SCNC: 7 MMOL/L (ref 5–15)
BASOPHILS # BLD: 0 K/UL (ref 0–0.1)
BASOPHILS # BLD: 0 K/UL (ref 0–0.1)
BASOPHILS NFR BLD: 0 % (ref 0–1)
BASOPHILS NFR BLD: 0 % (ref 0–1)
BUN SERPL-MCNC: 29 MG/DL (ref 6–20)
BUN SERPL-MCNC: 31 MG/DL (ref 6–20)
BUN/CREAT SERPL: 16 (ref 12–20)
BUN/CREAT SERPL: 18 (ref 12–20)
CALCIUM SERPL-MCNC: 9 MG/DL (ref 8.5–10.1)
CALCIUM SERPL-MCNC: 9.1 MG/DL (ref 8.5–10.1)
CHLORIDE SERPL-SCNC: 102 MMOL/L (ref 97–108)
CHLORIDE SERPL-SCNC: 104 MMOL/L (ref 97–108)
CO2 SERPL-SCNC: 27 MMOL/L (ref 21–32)
CO2 SERPL-SCNC: 28 MMOL/L (ref 21–32)
CREAT SERPL-MCNC: 1.69 MG/DL (ref 0.55–1.02)
CREAT SERPL-MCNC: 1.82 MG/DL (ref 0.55–1.02)
DIFFERENTIAL METHOD BLD: ABNORMAL
DIFFERENTIAL METHOD BLD: ABNORMAL
EOSINOPHIL # BLD: 0.1 K/UL (ref 0–0.4)
EOSINOPHIL # BLD: 0.2 K/UL (ref 0–0.4)
EOSINOPHIL NFR BLD: 2 % (ref 0–7)
EOSINOPHIL NFR BLD: 2 % (ref 0–7)
ERYTHROCYTE [DISTWIDTH] IN BLOOD BY AUTOMATED COUNT: 15.5 % (ref 11.5–14.5)
ERYTHROCYTE [DISTWIDTH] IN BLOOD BY AUTOMATED COUNT: 15.5 % (ref 11.5–14.5)
GLUCOSE SERPL-MCNC: 100 MG/DL (ref 65–100)
GLUCOSE SERPL-MCNC: 109 MG/DL (ref 65–100)
HCT VFR BLD AUTO: 28.7 % (ref 35–47)
HCT VFR BLD AUTO: 29.1 % (ref 35–47)
HGB BLD-MCNC: 8.7 G/DL (ref 11.5–16)
HGB BLD-MCNC: 8.9 G/DL (ref 11.5–16)
IMM GRANULOCYTES # BLD AUTO: 0 K/UL (ref 0–0.04)
IMM GRANULOCYTES # BLD AUTO: 0 K/UL (ref 0–0.04)
IMM GRANULOCYTES NFR BLD AUTO: 0 % (ref 0–0.5)
IMM GRANULOCYTES NFR BLD AUTO: 0 % (ref 0–0.5)
LYMPHOCYTES # BLD: 1.1 K/UL (ref 0.8–3.5)
LYMPHOCYTES # BLD: 1.1 K/UL (ref 0.8–3.5)
LYMPHOCYTES NFR BLD: 13 % (ref 12–49)
LYMPHOCYTES NFR BLD: 14 % (ref 12–49)
MCH RBC QN AUTO: 28.2 PG (ref 26–34)
MCH RBC QN AUTO: 28.8 PG (ref 26–34)
MCHC RBC AUTO-ENTMCNC: 30.3 G/DL (ref 30–36.5)
MCHC RBC AUTO-ENTMCNC: 30.6 G/DL (ref 30–36.5)
MCV RBC AUTO: 92.9 FL (ref 80–99)
MCV RBC AUTO: 94.2 FL (ref 80–99)
MONOCYTES # BLD: 0.7 K/UL (ref 0–1)
MONOCYTES # BLD: 0.7 K/UL (ref 0–1)
MONOCYTES NFR BLD: 9 % (ref 5–13)
MONOCYTES NFR BLD: 9 % (ref 5–13)
NEUTS SEG # BLD: 5.9 K/UL (ref 1.8–8)
NEUTS SEG # BLD: 6.1 K/UL (ref 1.8–8)
NEUTS SEG NFR BLD: 75 % (ref 32–75)
NEUTS SEG NFR BLD: 76 % (ref 32–75)
NRBC # BLD: 0 K/UL (ref 0–0.01)
NRBC # BLD: 0 K/UL (ref 0–0.01)
NRBC BLD-RTO: 0 PER 100 WBC
NRBC BLD-RTO: 0 PER 100 WBC
PLATELET # BLD AUTO: 162 K/UL (ref 150–400)
PLATELET # BLD AUTO: 166 K/UL (ref 150–400)
PMV BLD AUTO: 10.1 FL (ref 8.9–12.9)
PMV BLD AUTO: 10.8 FL (ref 8.9–12.9)
POTASSIUM SERPL-SCNC: 4.2 MMOL/L (ref 3.5–5.1)
POTASSIUM SERPL-SCNC: 4.4 MMOL/L (ref 3.5–5.1)
RBC # BLD AUTO: 3.09 M/UL (ref 3.8–5.2)
RBC # BLD AUTO: 3.09 M/UL (ref 3.8–5.2)
SODIUM SERPL-SCNC: 133 MMOL/L (ref 136–145)
SODIUM SERPL-SCNC: 136 MMOL/L (ref 136–145)
WBC # BLD AUTO: 7.9 K/UL (ref 3.6–11)
WBC # BLD AUTO: 8.1 K/UL (ref 3.6–11)

## 2024-04-11 PROCEDURE — 2580000003 HC RX 258: Performed by: INTERNAL MEDICINE

## 2024-04-11 PROCEDURE — 80048 BASIC METABOLIC PNL TOTAL CA: CPT

## 2024-04-11 PROCEDURE — 36415 COLL VENOUS BLD VENIPUNCTURE: CPT

## 2024-04-11 PROCEDURE — 85025 COMPLETE CBC W/AUTO DIFF WBC: CPT

## 2024-04-11 PROCEDURE — 2580000003 HC RX 258: Performed by: STUDENT IN AN ORGANIZED HEALTH CARE EDUCATION/TRAINING PROGRAM

## 2024-04-11 PROCEDURE — 6370000000 HC RX 637 (ALT 250 FOR IP): Performed by: STUDENT IN AN ORGANIZED HEALTH CARE EDUCATION/TRAINING PROGRAM

## 2024-04-11 PROCEDURE — 6370000000 HC RX 637 (ALT 250 FOR IP): Performed by: INTERNAL MEDICINE

## 2024-04-11 PROCEDURE — 1100000003 HC PRIVATE W/ TELEMETRY

## 2024-04-11 PROCEDURE — 94762 N-INVAS EAR/PLS OXIMTRY CONT: CPT

## 2024-04-11 PROCEDURE — 97530 THERAPEUTIC ACTIVITIES: CPT

## 2024-04-11 PROCEDURE — 97116 GAIT TRAINING THERAPY: CPT

## 2024-04-11 RX ORDER — SODIUM CHLORIDE 9 MG/ML
INJECTION, SOLUTION INTRAVENOUS CONTINUOUS
Status: DISCONTINUED | OUTPATIENT
Start: 2024-04-11 | End: 2024-04-12

## 2024-04-11 RX ORDER — HYDRALAZINE HYDROCHLORIDE 50 MG/1
50 TABLET, FILM COATED ORAL EVERY 8 HOURS SCHEDULED
Status: DISCONTINUED | OUTPATIENT
Start: 2024-04-11 | End: 2024-04-12 | Stop reason: HOSPADM

## 2024-04-11 RX ORDER — 0.9 % SODIUM CHLORIDE 0.9 %
500 INTRAVENOUS SOLUTION INTRAVENOUS ONCE
Status: COMPLETED | OUTPATIENT
Start: 2024-04-11 | End: 2024-04-11

## 2024-04-11 RX ADMIN — HYDRALAZINE HYDROCHLORIDE 25 MG: 25 TABLET ORAL at 06:49

## 2024-04-11 RX ADMIN — AMLODIPINE BESYLATE 10 MG: 5 TABLET ORAL at 09:02

## 2024-04-11 RX ADMIN — Medication 1000 UNITS: at 09:02

## 2024-04-11 RX ADMIN — HYDRALAZINE HYDROCHLORIDE 50 MG: 50 TABLET ORAL at 13:50

## 2024-04-11 RX ADMIN — HYDRALAZINE HYDROCHLORIDE 50 MG: 50 TABLET ORAL at 21:30

## 2024-04-11 RX ADMIN — FERROUS SULFATE TAB 325 MG (65 MG ELEMENTAL FE) 325 MG: 325 (65 FE) TAB at 06:49

## 2024-04-11 RX ADMIN — SODIUM CHLORIDE, PRESERVATIVE FREE 10 ML: 5 INJECTION INTRAVENOUS at 21:30

## 2024-04-11 RX ADMIN — SODIUM CHLORIDE 500 ML: 9 INJECTION, SOLUTION INTRAVENOUS at 10:28

## 2024-04-11 RX ADMIN — SODIUM CHLORIDE: 9 INJECTION, SOLUTION INTRAVENOUS at 16:14

## 2024-04-11 RX ADMIN — SODIUM CHLORIDE, PRESERVATIVE FREE 10 ML: 5 INJECTION INTRAVENOUS at 09:03

## 2024-04-11 ASSESSMENT — PAIN SCALES - GENERAL: PAINLEVEL_OUTOF10: 0

## 2024-04-11 NOTE — CARE COORDINATION
Pt clear from CM standpoint.     Transition of Care Plan:    RUR: 15% \"medium risk\"  Prior Level of Functioning: Independent with ADL's   Disposition: Home with [facility]   If SNF or IPR: Date FOC offered: N/A  Follow up appointments: PCP appt scheduled for 4/16 @ 1230PM  DME needed: None - pt has home O2  Transportation at discharge: Pt family to transport pt home  IM/IMM Medicare/ letter given: 2nd IM given by CM on 4/12   Is patient a  and connected with VA? No  Caregiver Contact: Bhargavi Coulter (sister), [phone]   Discharge Caregiver contacted prior to discharge? To be contacted   Care Conference needed? No  Barriers to discharge: None     1619 PM: CM aware of pt need for portable O2 tank. CM messaged [employer] to inquire about whether the pt was open with [employer]. Ayad with [employer] responded to CM and noted that [employer] delivered a concentrator to the pt in the past. Pt in need of a portable tank at d/c; Ayad to deliver portable tank to pt at the bedside. CM needs complete at this time.    1103 AM: CM met with pt and pt daughter at the bedside. Pt daughter has concerns about pt medical stability. Unit CM (Milena) to make MD aware.    0922 AM: Chart reviewed. CM met with pt at the bedside to discuss d/c plan. Pt to return home with family and [facility]. Pt family to transport pt home. PCP appt scheduled for 4/16 at 1230PM. 2nd IM given; copy placed on pt chart.      04/12/24 0925   Services At/After Discharge   Transition of Care Consult (CM Consult) Discharge Planning   Services At/After Discharge Home Health    Resource Information Provided? No   Mode of Transport at Discharge Other (see comment)  (Pt family to transport.)   Hospital Transport Time of Discharge 1200   Confirm Follow Up Transport Family   Condition of Participation: Discharge Planning   The Plan for Transition of Care is related to the following treatment goals: Return home independently   The Patient

## 2024-04-11 NOTE — PROGRESS NOTES
Patient was found to have SpO2 of 77% while sleeping and on room O2. Nasal canula applied on 2L while the patient is sleeping. Recommend sleep study for home Cpap.

## 2024-04-11 NOTE — PROGRESS NOTES
Hospitalist Progress Note    NAME:   Елена Sr   : 1945   MRN: [id number]     Date/Time: 2024 4:34 PM  Patient PCP: Tere Campo APRN - NP    Estimated discharge date: 24  Barriers: HB stability, monitoring creatinine      Assessment / Plan:    Retroperitoneal hematoma in the setting of Eliquis use  History of pulmonary embolism requiring Eliquis use  CT abdomen pelvis-Right retroperitoneal hematoma displacing the kidney anteriorly and extending  into the region of the right adrenal gland. Consider adrenal hemorrhage, however correlation with any history of trauma is recommended.  Hemoglobin on arrival 10.5  Will monitor hemoglobin and serial labs  Discontinue eliquis  Appreciate hematology consult    Cavitary lesion in left lower lobe  Appreciate pulmonary consult  Outpatient pulmonary follow up    Obesity:  Suspected BETO  Outpatient sleep studies  Overnight pulse oximetry       CHRISTINA on CKD  Start iv hydration  Hold HCTZ, and lisinopril  Will monitor creatinine     Bilateral lower extremity swelling  Likely due to adverse effects of amlodipine     History of iron deficiency anemia  History of vitamin D deficiency  Hypertension  Continue home medications  Hydralazine po added today due to persistent hypertension             Medical Decision Making:   I personally reviewed labs:cbc, BMP  I personally reviewed imaging:CTA chest, CT abdomen  I personally reviewed EKG:  Toxic drug monitoring:   Discussed case with: patient, family, RN        Code Status: full  DVT Prophylaxis:none- eliquis held due to retroperitoneal bleed  GI Prophylaxis:    Subjective:     Chief Complaint / Reason for Physician Visit  Patient denies any new complaints.  Family member by the bedside      Objective:     VITALS:   Last 24hrs VS reviewed since prior progress note. Most recent are:  Patient Vitals for the past 24 hrs:   BP Temp Temp src Pulse Resp SpO2 Weight   24 1603 (!) 162/69 99.1 °F (37.3 °C) Oral  7.9   HGB 9.4* 8.7* 8.9*   HCT 30.6* 28.7* 29.1*    166 162       Recent Labs     04/08/24  2252 04/09/24  0110 04/09/24  0651 04/10/24  0652 04/11/24  0504 04/11/24  1117     --    < > 140 133* 136   K 3.5  --    < > 4.2 4.4 4.2     --    < > 106 104 102   CO2 28  --    < > 28 28 27   GLUCOSE 143*  --    < > 106* 109* 100   BUN 21*  --    < > 23* 29* 31*   CREATININE 1.06*  --    < > 1.04* 1.82* 1.69*   CALCIUM 9.3  --    < > 9.4 9.1 9.0   LABALBU 3.1*  --   --   --   --   --    BILITOT 0.6  --   --   --   --   --    AST 11*  --   --   --   --   --    ALT 12  --   --   --   --   --    INR  --  1.0  --   --   --   --     < > = values in this interval not displayed.         Signed: Opal Mathias MD

## 2024-04-11 NOTE — PLAN OF CARE
Problem: Physical Therapy - Adult  Goal: By Discharge: Performs mobility at highest level of function for planned discharge setting.  See evaluation for individualized goals.  Description: FUNCTIONAL STATUS PRIOR TO ADMISSION: Patient was modified independent using a single point cane for functional mobility.    HOME SUPPORT PRIOR TO ADMISSION: The patient lived with family but did not require assistance.    Physical Therapy Goals  Initiated 4/10/2024  1.  Patient will move from supine to sit and sit to supine in bed with supervision/set-up within 7 day(s).    2.  Patient will perform sit to stand with supervision/set-up within 7 day(s).  3.  Patient will transfer from bed to chair and chair to bed with supervision/set-up using the least restrictive device within 7 day(s).  4.  Patient will ambulate with supervision/set-up for 100 feet with the least restrictive device within 7 day(s).   5.  Patient will ascend/descend 3 stairs with  handrail(s) with contact guard assist within 7 day(s).   Outcome: Progressing   PHYSICAL THERAPY TREATMENT    Patient: Елена Sr (79 y.o. female)  Date: 4/11/2024  Diagnosis: Adrenal hemorrhage (HCC) [E27.49]  Retroperitoneal hematoma [K68.3] Retroperitoneal hematoma      Precautions:                        ASSESSMENT:  Patient continues to benefit from skilled PT services and is progressing towards goals. Pt received semi fowlers in bed, agreeable and eager to participate in therapy. Daughter and grandson present and supportive throughout session. Received on 1L O2 oxygen 100%. Removed oxygen and performed most mobility on RA, however dropped to 84%, requiring ~1 minute to recover to above 90%. Pt continues to require assist for bed mobility with cueing for sequencing. Pt tolerated increased gait distance well with no observed LOB or unsteadiness, however benefits from use of RW in order to increase stability and promote safe mobilization. Pt continues to perform below baseline

## 2024-04-11 NOTE — CONSULTS
Pulmonary, Critical Care, and Sleep Medicine~Consult Note    Name: Елена Sr MRN: [id number]   : 1945 Hospital: [facility]   Date: 2024 11:57 AM Admission: 2024     IMPRESSION:   Enlarging LLL cavitary nodule  Recent pulmonary embolism 2023 on Eliquis - now admitted w/ retroperitoneal hematoma  HTN  Anemia  Obesity, suspected BETO      PLAN:   Reviewed imaging w/ Dr. Gaming. Will plan for outpt PET/CT  Recommend outpt sleep eval  Thank you for the consultation - Will see again as needed while inpt and arrange follow up.      Daily Progression:    80yo obese female nonsmoker w/ HTN, anemia, recent PE on Eliquis. Admitted 24 w/ abdominal pain. CT abdomen w/ retroperitoneal hematoma. Eliquis stopped. CTA Chest w/ resolution of pulmonary emboli, cavitary lesion LLL increasing in size. Previous admission 2023 w/ acute right sided CP. CTA w/ small PTE. Discharged on Eliquis. LLL cavitary nodule noted at that time. Seen by PAR and plans for repeat CT. Repeat imaging not done, not seen in office in interim. Nonsmoker. Chronic lower extremity edema unchanged. No history of asthma. Mild chronic TAPIA. No c/o cough, wheezing. No family history of lung cancer.      Resting in bed, family at bedside. On 2LPM. We reviewed imaging and plans. Provided family w/ out office number.   Labs/imaging reviewed. HGB 8.9.   CTA 23 : Small acute pulmonary emboli in the right lung. Indeterminate 7 mm cavitary nodule in the superior segment of the left lower lobe, possibly infectious or inflammatory.   CTA 4/10/24 :  The previously noted pulmonary emboli are not definitely visualized on this study. Cavitary lesion in the left lower lobe has increased in size now measuring 10mm. There is a small right pleural effusion.    I have reviewed the labs and previous day’s notes.    Pertinent items are noted in HPI.  Past Medical History:   Diagnosis Date    Hypertension

## 2024-04-11 NOTE — PLAN OF CARE
On 1L NC unable to wean off o2, sats drop to 88%.     AO x4, no other acute events overnight.        Problem: Safety - Adult  Goal: Free from fall injury  Outcome: Progressing     Problem: Discharge Planning  Goal: Discharge to home or other facility with appropriate resources  Outcome: Progressing  Flowsheets (Taken 4/10/2024 2000)  Discharge to home or other facility with appropriate resources: Identify barriers to discharge with patient and caregiver     Problem: Pain  Goal: Verbalizes/displays adequate comfort level or baseline comfort level  Outcome: Progressing  Flowsheets (Taken 4/10/2024 2115)  Verbalizes/displays adequate comfort level or baseline comfort level: Assess pain using appropriate pain scale

## 2024-04-11 NOTE — PROGRESS NOTES
Physical Therapy:    Attempted to see this pt for PT session. Family requesting therapy to come back later as they want to bath pt in bed. Will defer and try again at a later time. Thanks.    Delia Tai, PTA

## 2024-04-12 VITALS
RESPIRATION RATE: 18 BRPM | HEIGHT: 64 IN | HEART RATE: 87 BPM | OXYGEN SATURATION: 98 % | WEIGHT: 277.5 LBS | BODY MASS INDEX: 47.37 KG/M2 | SYSTOLIC BLOOD PRESSURE: 145 MMHG | TEMPERATURE: 97.8 F | DIASTOLIC BLOOD PRESSURE: 54 MMHG

## 2024-04-12 LAB
ANION GAP SERPL CALC-SCNC: 6 MMOL/L (ref 5–15)
BASOPHILS # BLD: 0 K/UL (ref 0–0.1)
BASOPHILS NFR BLD: 0 % (ref 0–1)
BUN SERPL-MCNC: 30 MG/DL (ref 6–20)
BUN/CREAT SERPL: 23 (ref 12–20)
CALCIUM SERPL-MCNC: 9.2 MG/DL (ref 8.5–10.1)
CHLORIDE SERPL-SCNC: 104 MMOL/L (ref 97–108)
CO2 SERPL-SCNC: 25 MMOL/L (ref 21–32)
CREAT SERPL-MCNC: 1.31 MG/DL (ref 0.55–1.02)
DIFFERENTIAL METHOD BLD: ABNORMAL
EOSINOPHIL # BLD: 0.1 K/UL (ref 0–0.4)
EOSINOPHIL NFR BLD: 2 % (ref 0–7)
ERYTHROCYTE [DISTWIDTH] IN BLOOD BY AUTOMATED COUNT: 15.1 % (ref 11.5–14.5)
GLUCOSE SERPL-MCNC: 101 MG/DL (ref 65–100)
HCT VFR BLD AUTO: 29.2 % (ref 35–47)
HGB BLD-MCNC: 9 G/DL (ref 11.5–16)
IMM GRANULOCYTES # BLD AUTO: 0 K/UL (ref 0–0.04)
IMM GRANULOCYTES NFR BLD AUTO: 0 % (ref 0–0.5)
LYMPHOCYTES # BLD: 1 K/UL (ref 0.8–3.5)
LYMPHOCYTES NFR BLD: 14 % (ref 12–49)
MCH RBC QN AUTO: 28.8 PG (ref 26–34)
MCHC RBC AUTO-ENTMCNC: 30.8 G/DL (ref 30–36.5)
MCV RBC AUTO: 93.6 FL (ref 80–99)
MONOCYTES # BLD: 0.7 K/UL (ref 0–1)
MONOCYTES NFR BLD: 10 % (ref 5–13)
NEUTS SEG # BLD: 5 K/UL (ref 1.8–8)
NEUTS SEG NFR BLD: 74 % (ref 32–75)
NRBC # BLD: 0 K/UL (ref 0–0.01)
NRBC BLD-RTO: 0 PER 100 WBC
PLATELET # BLD AUTO: 166 K/UL (ref 150–400)
PMV BLD AUTO: 10.4 FL (ref 8.9–12.9)
POTASSIUM SERPL-SCNC: 4.3 MMOL/L (ref 3.5–5.1)
RBC # BLD AUTO: 3.12 M/UL (ref 3.8–5.2)
SODIUM SERPL-SCNC: 135 MMOL/L (ref 136–145)
WBC # BLD AUTO: 6.8 K/UL (ref 3.6–11)

## 2024-04-12 PROCEDURE — 85025 COMPLETE CBC W/AUTO DIFF WBC: CPT

## 2024-04-12 PROCEDURE — 6370000000 HC RX 637 (ALT 250 FOR IP): Performed by: STUDENT IN AN ORGANIZED HEALTH CARE EDUCATION/TRAINING PROGRAM

## 2024-04-12 PROCEDURE — 6360000002 HC RX W HCPCS: Performed by: STUDENT IN AN ORGANIZED HEALTH CARE EDUCATION/TRAINING PROGRAM

## 2024-04-12 PROCEDURE — 2580000003 HC RX 258: Performed by: STUDENT IN AN ORGANIZED HEALTH CARE EDUCATION/TRAINING PROGRAM

## 2024-04-12 PROCEDURE — 6370000000 HC RX 637 (ALT 250 FOR IP): Performed by: INTERNAL MEDICINE

## 2024-04-12 PROCEDURE — 36415 COLL VENOUS BLD VENIPUNCTURE: CPT

## 2024-04-12 PROCEDURE — 80048 BASIC METABOLIC PNL TOTAL CA: CPT

## 2024-04-12 RX ADMIN — HYDRALAZINE HYDROCHLORIDE 50 MG: 50 TABLET ORAL at 06:32

## 2024-04-12 RX ADMIN — HYDRALAZINE HYDROCHLORIDE 50 MG: 50 TABLET ORAL at 14:13

## 2024-04-12 RX ADMIN — Medication 1000 UNITS: at 08:36

## 2024-04-12 RX ADMIN — TRAMADOL HYDROCHLORIDE 50 MG: 50 TABLET ORAL at 01:35

## 2024-04-12 RX ADMIN — ONDANSETRON 4 MG: 2 INJECTION INTRAMUSCULAR; INTRAVENOUS at 01:35

## 2024-04-12 RX ADMIN — AMLODIPINE BESYLATE 10 MG: 5 TABLET ORAL at 08:36

## 2024-04-12 RX ADMIN — FERROUS SULFATE TAB 325 MG (65 MG ELEMENTAL FE) 325 MG: 325 (65 FE) TAB at 06:32

## 2024-04-12 RX ADMIN — MORPHINE SULFATE 2 MG: 2 INJECTION, SOLUTION INTRAMUSCULAR; INTRAVENOUS at 00:39

## 2024-04-12 RX ADMIN — HYDRALAZINE HYDROCHLORIDE 10 MG: 20 INJECTION INTRAMUSCULAR; INTRAVENOUS at 00:38

## 2024-04-12 RX ADMIN — SODIUM CHLORIDE, PRESERVATIVE FREE 10 ML: 5 INJECTION INTRAVENOUS at 08:36

## 2024-04-12 ASSESSMENT — PAIN DESCRIPTION - DESCRIPTORS
DESCRIPTORS: ACHING
DESCRIPTORS: ACHING

## 2024-04-12 ASSESSMENT — PAIN SCALES - GENERAL
PAINLEVEL_OUTOF10: 8
PAINLEVEL_OUTOF10: 7

## 2024-04-12 ASSESSMENT — PAIN DESCRIPTION - ORIENTATION
ORIENTATION: LOWER
ORIENTATION: LOWER

## 2024-04-12 ASSESSMENT — PAIN DESCRIPTION - LOCATION
LOCATION: BACK;LEG
LOCATION: ABDOMEN;BACK

## 2024-04-12 NOTE — DISCHARGE SUMMARY
Discharge Summary    Name: Елена Sr  [id number]  YOB: 1945 (Age: 79 y.o.)   Date of Admission: 4/8/2024  Date of Discharge: 4/12/2024  Attending Physician: Opal Mathias MD    Discharge Diagnosis:   Retroperitoneal hematoma in the setting of Eliquis use  History of pulmonary embolism requiring Eliquis use  Enlarging LLL cavitary nodule  Obesity:  Suspected BETO  CHRISTINA on CKD   History of iron deficiency anemia  History of vitamin D deficiency  Hypertension    Consultations:  IP CONSULT TO HEMATOLOGY  IP CONSULT TO PULMONOLOGY  IP CONSULT TO CASE MANAGEMENT  IP CONSULT TO CASE MANAGEMENT      Brief Admission History/Reason for Admission Per Anam Singh MD:   Елена Sr is a 79 y.o.  female with PMHx significant as below came to the hospital for evaluation of pain in the right upper and lower abdomen since this evening.  Patient apparently was all right and was having her dinner.  The pain started suddenly in the right upper and lower abdomen-which is about 8 / 10 sharp nonradiating, aggravated by movements, with no specific relieving factors.  Patient is a known case of pulmonary embolism and has been on Eliquis since December last year.  Patient has followed up with her primary care doctor who has advised her to remain on Eliquis for total of 6 months.       Brief Hospital Course by Main Problems:   Retroperitoneal hematoma in the setting of Eliquis use  History of pulmonary embolism requiring Eliquis use  CT abdomen pelvis-Right retroperitoneal hematoma displacing the kidney anteriorly and extending  into the region of the right adrenal gland. Consider adrenal hemorrhage, however correlation with any history of trauma is recommended.  Hemoglobin on arrival 10.5, stable around 9  Eliquis discontinued  Appreciate hematology consult     Cavitary lesion in left lower lobe  Appreciate pulmonary consult  Outpatient pulmonary follow up  Outpatient PET/CT      Obesity:  Suspected BETO  Outpatient sleep studies          CHRISTINA on CKD  resolved     Bilateral lower extremity swelling  Likely due to adverse effects of amlodipine     History of iron deficiency anemia  History of vitamin D deficiency  Hypertension  Continue home medications            Discharge Exam:  Patient seen and examined by me on discharge day.  Pertinent Findings:  Patient Vitals for the past 24 hrs:   BP Temp Temp src Pulse Resp SpO2 Weight   04/12/24 1537 (!) 145/54 97.8 °F (36.6 °C) Oral 87 18 98 % --   04/12/24 1413 (!) 133/54 -- -- -- -- -- --   04/12/24 1115 (!) 128/56 98.3 °F (36.8 °C) Oral 88 20 91 % --   04/12/24 0836 (!) 117/50 99 °F (37.2 °C) Oral 97 21 97 % --   04/12/24 0630 -- -- -- -- -- -- 125.9 kg (277 lb 8 oz)   04/12/24 0323 (!) 145/64 98.7 °F (37.1 °C) Oral (!) 101 17 94 % --   04/12/24 0109 -- -- -- -- 19 -- --   04/12/24 0038 (!) 166/58 -- -- -- -- -- --   04/11/24 2305 (!) 168/60 98.2 °F (36.8 °C) Oral (!) 102 -- 95 % --   04/11/24 2300 -- -- -- -- -- 100 % --   04/11/24 2100 (!) 151/89 98.3 °F (36.8 °C) Oral (!) 102 -- (!) 86 % --   04/11/24 2000 (!) 164/74 -- -- 100 -- 94 % --       Gen:    Not in distress  Chest: Clear lungs  CVS:   Regular rhythm.  No edema  Abd:  Soft, not distended, not tender  Neuro: awake, moving all exts    Discharge/Recent Laboratory Results:  Recent Labs     04/12/24  0356   *   K 4.3      CO2 25   BUN 30*   CREATININE 1.31*   GLUCOSE 101*   CALCIUM 9.2     Recent Labs     04/12/24  0356   HGB 9.0*   HCT 29.2*   WBC 6.8          Discharge Medications:     Medication List        CONTINUE taking these medications      amLODIPine 10 MG tablet  Commonly known as: NORVASC     ferrous sulfate 325 (65 Fe) MG tablet  Commonly known as: IRON 325     hydroCHLOROthiazide 12.5 MG tablet     lisinopril 20 MG tablet  Commonly known as: PRINIVIL;ZESTRIL     traMADol 50 MG tablet  Commonly known as: ULTRAM     vitamin D 25 MCG (1000 UT) Tabs

## 2024-04-12 NOTE — PLAN OF CARE
Problem: Safety - Adult  Goal: Free from fall injury  Outcome: Completed     Problem: Discharge Planning  Goal: Discharge to home or other facility with appropriate resources  Outcome: Completed     Problem: Pain  Goal: Verbalizes/displays adequate comfort level or baseline comfort level  Outcome: Completed     Problem: Skin/Tissue Integrity  Goal: Absence of new skin breakdown  Description: 1.  Monitor for areas of redness and/or skin breakdown  2.  Assess vascular access sites hourly  3.  Every 4-6 hours minimum:  Change oxygen saturation probe site  4.  Every 4-6 hours:  If on nasal continuous positive airway pressure, respiratory therapy assess nares and determine need for appliance change or resting period.  Outcome: Completed

## 2024-04-12 NOTE — PROGRESS NOTES
Home Oxygen Test  Date of test: 4/12/2024  Time of test: 1315    Sa02 88 % on room air AT REST.  Sa02 84 % on room air DURING AMBULATION.  Sa02 94 % on 2 Liters DURING AMBULATION.  Sa02 94 % on 2 Liters AT REST/AFTER AMBULATION.

## 2025-02-18 ENCOUNTER — HOSPITAL ENCOUNTER (EMERGENCY)
Facility: HOSPITAL | Age: 80
Discharge: HOME | End: 2025-02-18
Attending: STUDENT IN AN ORGANIZED HEALTH CARE EDUCATION/TRAINING PROGRAM
Payer: MEDICARE

## 2025-02-18 ENCOUNTER — APPOINTMENT (OUTPATIENT)
Facility: HOSPITAL | Age: 80
End: 2025-02-18
Payer: MEDICARE

## 2025-02-18 VITALS
SYSTOLIC BLOOD PRESSURE: 105 MMHG | RESPIRATION RATE: 16 BRPM | OXYGEN SATURATION: 94 % | TEMPERATURE: 97.9 F | DIASTOLIC BLOOD PRESSURE: 69 MMHG | HEART RATE: 100 BPM

## 2025-02-18 DIAGNOSIS — N39.0 URINARY TRACT INFECTION WITHOUT HEMATURIA, SITE UNSPECIFIED: Primary | ICD-10-CM

## 2025-02-18 LAB
ALBUMIN SERPL-MCNC: 2.7 G/DL (ref 3.5–5)
ALBUMIN/GLOB SERPL: 0.6 (ref 1.1–2.2)
ALP SERPL-CCNC: 55 U/L (ref 45–117)
ALT SERPL-CCNC: 11 U/L (ref 12–78)
ANION GAP SERPL CALC-SCNC: 7 MMOL/L (ref 2–12)
APPEARANCE UR: ABNORMAL
AST SERPL-CCNC: 20 U/L (ref 15–37)
BACTERIA URNS QL MICRO: ABNORMAL /HPF
BASOPHILS # BLD: 0.02 K/UL (ref 0–0.1)
BASOPHILS NFR BLD: 0.2 % (ref 0–1)
BILIRUB SERPL-MCNC: 1 MG/DL (ref 0.2–1)
BILIRUB UR QL CFM: NEGATIVE
BUN SERPL-MCNC: 58 MG/DL (ref 6–20)
BUN/CREAT SERPL: 20 (ref 12–20)
CALCIUM SERPL-MCNC: 8.9 MG/DL (ref 8.5–10.1)
CHLORIDE SERPL-SCNC: 103 MMOL/L (ref 97–108)
CO2 SERPL-SCNC: 24 MMOL/L (ref 21–32)
COLOR UR: ABNORMAL
CREAT SERPL-MCNC: 2.9 MG/DL (ref 0.55–1.02)
DIFFERENTIAL METHOD BLD: ABNORMAL
EOSINOPHIL # BLD: 0.14 K/UL (ref 0–0.4)
EOSINOPHIL NFR BLD: 1.7 % (ref 0–7)
EPITH CASTS URNS QL MICRO: ABNORMAL /LPF
ERYTHROCYTE [DISTWIDTH] IN BLOOD BY AUTOMATED COUNT: 17.4 % (ref 11.5–14.5)
GLOBULIN SER CALC-MCNC: 4.5 G/DL (ref 2–4)
GLUCOSE SERPL-MCNC: 103 MG/DL (ref 65–100)
GLUCOSE UR STRIP.AUTO-MCNC: NEGATIVE MG/DL
HCT VFR BLD AUTO: 29 % (ref 35–47)
HGB BLD-MCNC: 9.5 G/DL (ref 11.5–16)
HGB UR QL STRIP: NEGATIVE
IMM GRANULOCYTES # BLD AUTO: 0.05 K/UL (ref 0–0.04)
IMM GRANULOCYTES NFR BLD AUTO: 0.6 % (ref 0–0.5)
KETONES UR QL STRIP.AUTO: ABNORMAL MG/DL
LEUKOCYTE ESTERASE UR QL STRIP.AUTO: NEGATIVE
LYMPHOCYTES # BLD: 1.09 K/UL (ref 0.8–3.5)
LYMPHOCYTES NFR BLD: 13.2 % (ref 12–49)
MCH RBC QN AUTO: 26.9 PG (ref 26–34)
MCHC RBC AUTO-ENTMCNC: 32.8 G/DL (ref 30–36.5)
MCV RBC AUTO: 82.2 FL (ref 80–99)
MONOCYTES # BLD: 0.7 K/UL (ref 0–1)
MONOCYTES NFR BLD: 8.5 % (ref 5–13)
NEUTS SEG # BLD: 6.28 K/UL (ref 1.8–8)
NEUTS SEG NFR BLD: 75.8 % (ref 32–75)
NITRITE UR QL STRIP.AUTO: NEGATIVE
NRBC # BLD: 0 K/UL (ref 0–0.01)
NRBC BLD-RTO: 0 PER 100 WBC
PH UR STRIP: 6 (ref 5–8)
PLATELET # BLD AUTO: 191 K/UL (ref 150–400)
PMV BLD AUTO: 10.1 FL (ref 8.9–12.9)
POTASSIUM SERPL-SCNC: 3.9 MMOL/L (ref 3.5–5.1)
PROT SERPL-MCNC: 7.2 G/DL (ref 6.4–8.2)
PROT UR STRIP-MCNC: ABNORMAL MG/DL
RBC # BLD AUTO: 3.53 M/UL (ref 3.8–5.2)
RBC #/AREA URNS HPF: ABNORMAL /HPF (ref 0–5)
SODIUM SERPL-SCNC: 134 MMOL/L (ref 136–145)
SP GR UR REFRACTOMETRY: 1.02 (ref 1–1.03)
URINE CULTURE IF INDICATED: ABNORMAL
UROBILINOGEN UR QL STRIP.AUTO: 0.2 EU/DL (ref 0.2–1)
WBC # BLD AUTO: 8.3 K/UL (ref 3.6–11)
WBC URNS QL MICRO: ABNORMAL /HPF (ref 0–4)

## 2025-02-18 PROCEDURE — 2580000003 HC RX 258: Performed by: STUDENT IN AN ORGANIZED HEALTH CARE EDUCATION/TRAINING PROGRAM

## 2025-02-18 PROCEDURE — 99284 EMERGENCY DEPT VISIT MOD MDM: CPT

## 2025-02-18 PROCEDURE — 80053 COMPREHEN METABOLIC PANEL: CPT

## 2025-02-18 PROCEDURE — 85025 COMPLETE CBC W/AUTO DIFF WBC: CPT

## 2025-02-18 PROCEDURE — 81001 URINALYSIS AUTO W/SCOPE: CPT

## 2025-02-18 PROCEDURE — 71046 X-RAY EXAM CHEST 2 VIEWS: CPT

## 2025-02-18 PROCEDURE — 36415 COLL VENOUS BLD VENIPUNCTURE: CPT

## 2025-02-18 RX ORDER — CEFDINIR 300 MG/1
300 CAPSULE ORAL ONCE
Status: DISCONTINUED | OUTPATIENT
Start: 2025-02-18 | End: 2025-02-18 | Stop reason: HOSPADM

## 2025-02-18 RX ORDER — 0.9 % SODIUM CHLORIDE 0.9 %
500 INTRAVENOUS SOLUTION INTRAVENOUS ONCE
Status: COMPLETED | OUTPATIENT
Start: 2025-02-18 | End: 2025-02-18

## 2025-02-18 RX ORDER — CEFDINIR 300 MG/1
300 CAPSULE ORAL DAILY
Qty: 7 CAPSULE | Refills: 0 | Status: SHIPPED | OUTPATIENT
Start: 2025-02-18 | End: 2025-02-25

## 2025-02-18 RX ADMIN — SODIUM CHLORIDE 500 ML: 9 INJECTION, SOLUTION INTRAVENOUS at 11:26

## 2025-02-18 ASSESSMENT — PAIN SCALES - GENERAL: PAINLEVEL_OUTOF10: 0

## 2025-02-18 ASSESSMENT — PAIN - FUNCTIONAL ASSESSMENT: PAIN_FUNCTIONAL_ASSESSMENT: 0-10

## 2025-02-18 NOTE — ED PROVIDER NOTES
U outpatient clinic today, but no documentation.  Patient without any acute complaints.  She has not been eating/drinking much over past two weeks, but no changes today.  No hypotension here in the ED.  UTI possible based on complaints.  - CBC, CMP  - CXR, UA  - IVF  - orthostatics    Amount and/or Complexity of Data Reviewed  Labs: ordered. Decision-making details documented in ED Course.  Radiology: ordered.    Risk  Prescription drug management.        ED Course as of 02/19/25 0555   Tue Feb 18, 2025   1106 Reviewed [facility] ED note from Dr. Philip from 2 - 15 - 25 where she was seen for weakness secondary to influenza.  She did not have any significant complaints.  Labs were at baseline, and no imaging was obtained.    UA did not show infection. [WB]   1108 Patient states \"I really don't know\" when asked what is wrong today.  She was sent by her family.   [WB]   1311 Serum labs largely unremarkable.  CKD may be slightly worse than normal, but close to baseline - last three Cr levels at U have been between 2.1 and 2.7.  This was treated with judicious IVF.  CXR with no acute abnormality noted. [WB]   1329 Patient's family still not here at bedside. [WB]   1516 UA with evidence of UTI.  Will give first dose of antibiotics and then plan on discharge.  Discussed care with her son, Chapo, who was in agreement. [WB]      ED Course User Index  [WB] Otilio Duong MD     [facility] 0    FINAL IMPRESSION     1. Urinary tract infection without hematuria, site unspecified          DISPOSITION/PLAN   Елена JUAN MANUEL Sr's  results have been reviewed with her.  She has been counseled regarding her diagnosis, treatment, and plan.  She verbally conveys understanding and agreement of the signs, symptoms, diagnosis, treatment and prognosis and additionally agrees to follow up as discussed.  She also agrees with the care-plan and conveys that all of her questions have been answered.  I have also provided discharge

## 2025-02-18 NOTE — ED NOTES
Pt stating that her daughter took her to a doctor with [facility] this morning, who told her to go to the ER. Pt testing positive for the flu over the weekend. Pt is unable to say specifically which doctor or facility with [facility] she was at this morning. Pt daughter not present at time of assessment by this RN and Dr. Duong.

## 2025-02-21 ENCOUNTER — TELEPHONE (OUTPATIENT)
Age: 80
End: 2025-02-21

## 2025-02-21 NOTE — TELEPHONE ENCOUNTER
[facility] Palliative Medicine Office  Nursing Note  [phone]  Fax [phone]      Name:  Елена Sr  YOB: 1945    Received faxed outpatient Palliative Medicine referral from Dario Dubose NP at [facility] to see patient for symptom management and supportive care. Faxed note reviewed. Елена Sr is a 79 y.o. female with lack of appetite and weight loss.  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  Was in the ED on 2/17/25 and tested positive for Flu A.     This nurse called Ariana SMALLWOOD in Dario Dubose's office and explained that due to provider staffing levels in [facility] outpatient Palliative Medicine, outpatient Palliative appointments are currently limited to patients referred from within [facility].  This nurse suggested to Ariana that they try the [facility] palliative clinic (the [facility] at [facility]).     Rubi Waters RN, Gerontological Nursing-BC, CHPN